# Patient Record
Sex: FEMALE | Race: BLACK OR AFRICAN AMERICAN | Employment: UNEMPLOYED | URBAN - METROPOLITAN AREA
[De-identification: names, ages, dates, MRNs, and addresses within clinical notes are randomized per-mention and may not be internally consistent; named-entity substitution may affect disease eponyms.]

---

## 2017-05-26 ENCOUNTER — HOSPITAL ENCOUNTER (EMERGENCY)
Age: 27
Discharge: HOME | End: 2017-05-27
Attending: EMERGENCY MEDICINE
Payer: COMMERCIAL

## 2017-05-26 VITALS
DIASTOLIC BLOOD PRESSURE: 65 MMHG | HEART RATE: 79 BPM | TEMPERATURE: 97.9 F | SYSTOLIC BLOOD PRESSURE: 107 MMHG | WEIGHT: 136 LBS | OXYGEN SATURATION: 98 % | RESPIRATION RATE: 15 BRPM | HEIGHT: 63 IN | BODY MASS INDEX: 24.1 KG/M2

## 2017-05-26 DIAGNOSIS — B97.89 VIRAL SINUSITIS: Primary | ICD-10-CM

## 2017-05-26 DIAGNOSIS — J32.9 VIRAL SINUSITIS: Primary | ICD-10-CM

## 2017-05-26 PROCEDURE — 99282 EMERGENCY DEPT VISIT SF MDM: CPT

## 2017-05-26 ASSESSMENT — PAIN DESCRIPTION - LOCATION: LOCATION: THROAT

## 2017-05-26 ASSESSMENT — PAIN DESCRIPTION - DESCRIPTORS: DESCRIPTORS: SORE

## 2017-05-26 ASSESSMENT — PAIN SCALES - GENERAL: PAINLEVEL_OUTOF10: 7

## 2017-05-27 RX ORDER — IBUPROFEN 400 MG/1
400 TABLET ORAL EVERY 6 HOURS PRN
Qty: 30 TABLET | Refills: 0 | Status: SHIPPED | OUTPATIENT
Start: 2017-05-27 | End: 2019-07-03 | Stop reason: SDUPTHER

## 2017-05-27 RX ORDER — PSEUDOEPHEDRINE HYDROCHLORIDE 30 MG/1
30 TABLET ORAL EVERY 6 HOURS PRN
Qty: 10 TABLET | Refills: 0 | Status: SHIPPED | OUTPATIENT
Start: 2017-05-27 | End: 2017-05-27 | Stop reason: CLARIF

## 2017-05-27 RX ORDER — FLUTICASONE PROPIONATE 50 MCG
1 SPRAY, SUSPENSION (ML) NASAL DAILY
Qty: 1 BOTTLE | Refills: 0 | Status: SHIPPED | OUTPATIENT
Start: 2017-05-27 | End: 2019-10-22 | Stop reason: ALTCHOICE

## 2017-05-27 ASSESSMENT — ENCOUNTER SYMPTOMS
WHEEZING: 0
ABDOMINAL PAIN: 0
HOARSE VOICE: 1
NAUSEA: 0
SORE THROAT: 0
VOMITING: 0
SHORTNESS OF BREATH: 0
COUGH: 1
CHOKING: 0
SINUS PRESSURE: 1
SWOLLEN GLANDS: 0
CHEST TIGHTNESS: 0
RHINORRHEA: 1
VOICE CHANGE: 1
CONSTIPATION: 0
DIARRHEA: 0
EYE PAIN: 0
TROUBLE SWALLOWING: 0

## 2018-01-05 ENCOUNTER — HOSPITAL ENCOUNTER (OUTPATIENT)
Age: 28
Setting detail: SPECIMEN
Discharge: HOME | End: 2018-01-05
Payer: COMMERCIAL

## 2018-01-05 LAB
ABSOLUTE EOS #: 0.08 K/UL (ref 0–0.44)
ABSOLUTE IMMATURE GRANULOCYTE: <0.03 K/UL (ref 0–0.3)
ABSOLUTE LYMPH #: 2.44 K/UL (ref 1.1–3.7)
ABSOLUTE MONO #: 0.47 K/UL (ref 0.1–1.2)
ALBUMIN SERPL-MCNC: 4.7 G/DL (ref 3.5–5.2)
ALBUMIN/GLOBULIN RATIO: 1.5 (ref 1–2.5)
ALP BLD-CCNC: 51 U/L (ref 35–104)
ALT SERPL-CCNC: 15 U/L (ref 5–33)
ANION GAP SERPL CALCULATED.3IONS-SCNC: 13 MMOL/L (ref 9–17)
AST SERPL-CCNC: 22 U/L
BASOPHILS # BLD: 0 % (ref 0–2)
BASOPHILS ABSOLUTE: <0.03 K/UL (ref 0–0.2)
BILIRUB SERPL-MCNC: 0.54 MG/DL (ref 0.3–1.2)
BILIRUBIN URINE: NEGATIVE
BUN BLDV-MCNC: 14 MG/DL (ref 6–20)
BUN/CREAT BLD: NORMAL (ref 9–20)
CALCIUM SERPL-MCNC: 9.6 MG/DL (ref 8.6–10.4)
CHLORIDE BLD-SCNC: 101 MMOL/L (ref 98–107)
CHOLESTEROL/HDL RATIO: 2.3
CHOLESTEROL: 157 MG/DL
CO2: 24 MMOL/L (ref 20–31)
COLOR: YELLOW
COMMENT UA: NORMAL
CREAT SERPL-MCNC: 0.72 MG/DL (ref 0.5–0.9)
DIFFERENTIAL TYPE: ABNORMAL
EOSINOPHILS RELATIVE PERCENT: 1 % (ref 1–4)
ESTIMATED AVERAGE GLUCOSE: 94 MG/DL
GFR AFRICAN AMERICAN: >60 ML/MIN
GFR NON-AFRICAN AMERICAN: >60 ML/MIN
GFR SERPL CREATININE-BSD FRML MDRD: NORMAL ML/MIN/{1.73_M2}
GFR SERPL CREATININE-BSD FRML MDRD: NORMAL ML/MIN/{1.73_M2}
GLUCOSE BLD-MCNC: 72 MG/DL (ref 70–99)
GLUCOSE URINE: NEGATIVE
HBA1C MFR BLD: 4.9 % (ref 4–6)
HCT VFR BLD CALC: 42.4 % (ref 36.3–47.1)
HDLC SERPL-MCNC: 67 MG/DL
HEMOGLOBIN: 13.9 G/DL (ref 11.9–15.1)
IMMATURE GRANULOCYTES: 0 %
KETONES, URINE: NEGATIVE
LDL CHOLESTEROL: 74 MG/DL (ref 0–130)
LEUKOCYTE ESTERASE, URINE: NEGATIVE
LYMPHOCYTES # BLD: 44 % (ref 24–43)
MCH RBC QN AUTO: 33.2 PG (ref 25.2–33.5)
MCHC RBC AUTO-ENTMCNC: 32.8 G/DL (ref 28.4–34.8)
MCV RBC AUTO: 101.2 FL (ref 82.6–102.9)
MONOCYTES # BLD: 9 % (ref 3–12)
NITRITE, URINE: NEGATIVE
PDW BLD-RTO: 13.5 % (ref 11.8–14.4)
PH UA: 5.5 (ref 5–8)
PLATELET # BLD: 232 K/UL (ref 138–453)
PLATELET ESTIMATE: ABNORMAL
PMV BLD AUTO: 10.1 FL (ref 8.1–13.5)
POTASSIUM SERPL-SCNC: 4.1 MMOL/L (ref 3.7–5.3)
PROTEIN UA: NEGATIVE
RBC # BLD: 4.19 M/UL (ref 3.95–5.11)
RBC # BLD: ABNORMAL 10*6/UL
SEG NEUTROPHILS: 46 % (ref 36–65)
SEGMENTED NEUTROPHILS ABSOLUTE COUNT: 2.51 K/UL (ref 1.5–8.1)
SODIUM BLD-SCNC: 138 MMOL/L (ref 135–144)
SPECIFIC GRAVITY UA: 1.03 (ref 1–1.03)
THYROXINE, FREE: 1.19 NG/DL (ref 0.93–1.7)
TOTAL PROTEIN: 7.9 G/DL (ref 6.4–8.3)
TRIGL SERPL-MCNC: 78 MG/DL
TSH SERPL DL<=0.05 MIU/L-ACNC: 0.74 MIU/L (ref 0.3–5)
TURBIDITY: CLEAR
URINE HGB: NEGATIVE
UROBILINOGEN, URINE: NORMAL
VITAMIN D 25-HYDROXY: 13.1 NG/ML (ref 30–100)
VLDLC SERPL CALC-MCNC: NORMAL MG/DL (ref 1–30)
WBC # BLD: 5.5 K/UL (ref 3.5–11.3)
WBC # BLD: ABNORMAL 10*3/UL

## 2018-01-09 ENCOUNTER — HOSPITAL ENCOUNTER (OUTPATIENT)
Dept: GENERAL RADIOLOGY | Age: 28
Discharge: HOME | End: 2018-01-09
Payer: COMMERCIAL

## 2018-01-09 ENCOUNTER — HOSPITAL ENCOUNTER (OUTPATIENT)
Age: 28
Discharge: HOME | End: 2018-01-09
Payer: COMMERCIAL

## 2018-01-09 DIAGNOSIS — M54.31 BACK PAIN WITH RIGHT-SIDED SCIATICA: ICD-10-CM

## 2018-01-09 PROCEDURE — 72220 X-RAY EXAM SACRUM TAILBONE: CPT

## 2018-01-09 PROCEDURE — 72100 X-RAY EXAM L-S SPINE 2/3 VWS: CPT

## 2018-10-17 ENCOUNTER — OFFICE VISIT (OUTPATIENT)
Dept: OBGYN | Age: 28
End: 2018-10-17
Payer: COMMERCIAL

## 2018-10-17 ENCOUNTER — HOSPITAL ENCOUNTER (OUTPATIENT)
Age: 28
Setting detail: SPECIMEN
Discharge: HOME | End: 2018-10-17
Payer: COMMERCIAL

## 2018-10-17 VITALS
SYSTOLIC BLOOD PRESSURE: 131 MMHG | BODY MASS INDEX: 27.27 KG/M2 | HEART RATE: 85 BPM | WEIGHT: 153.9 LBS | HEIGHT: 63 IN | DIASTOLIC BLOOD PRESSURE: 86 MMHG

## 2018-10-17 DIAGNOSIS — N89.8 VAGINAL ODOR: ICD-10-CM

## 2018-10-17 DIAGNOSIS — Z01.419 WELL WOMAN EXAM WITH ROUTINE GYNECOLOGICAL EXAM: Primary | ICD-10-CM

## 2018-10-17 LAB
DIRECT EXAM: ABNORMAL
Lab: ABNORMAL
SPECIMEN DESCRIPTION: ABNORMAL
STATUS: ABNORMAL

## 2018-10-17 PROCEDURE — 99385 PREV VISIT NEW AGE 18-39: CPT | Performed by: OBSTETRICS & GYNECOLOGY

## 2018-10-17 PROCEDURE — 99203 OFFICE O/P NEW LOW 30 MIN: CPT | Performed by: OBSTETRICS & GYNECOLOGY

## 2018-10-17 PROCEDURE — G8484 FLU IMMUNIZE NO ADMIN: HCPCS | Performed by: OBSTETRICS & GYNECOLOGY

## 2018-10-17 RX ORDER — METRONIDAZOLE 500 MG/1
500 TABLET ORAL 2 TIMES DAILY
Qty: 14 TABLET | Refills: 0 | Status: SHIPPED | OUTPATIENT
Start: 2018-10-17 | End: 2018-10-24

## 2018-10-17 RX ORDER — NAPROXEN 500 MG/1
500 TABLET ORAL
COMMUNITY
Start: 2017-02-16 | End: 2019-07-03

## 2018-10-17 RX ORDER — FLUCONAZOLE 150 MG/1
TABLET ORAL
Refills: 0 | COMMUNITY
Start: 2018-07-24 | End: 2019-10-22 | Stop reason: ALTCHOICE

## 2018-10-18 LAB
C TRACH DNA GENITAL QL NAA+PROBE: NEGATIVE
N. GONORRHOEAE DNA: NEGATIVE

## 2018-10-18 RX ORDER — METRONIDAZOLE 500 MG/1
500 TABLET ORAL 2 TIMES DAILY
Qty: 14 TABLET | Refills: 0 | Status: SHIPPED | OUTPATIENT
Start: 2018-10-18 | End: 2018-10-25

## 2018-11-03 LAB — CYTOLOGY REPORT: NORMAL

## 2019-01-17 ENCOUNTER — OFFICE VISIT (OUTPATIENT)
Dept: OBGYN | Age: 29
End: 2019-01-17
Payer: COMMERCIAL

## 2019-01-17 VITALS
HEART RATE: 110 BPM | BODY MASS INDEX: 26.21 KG/M2 | HEIGHT: 63 IN | DIASTOLIC BLOOD PRESSURE: 82 MMHG | WEIGHT: 147.9 LBS | SYSTOLIC BLOOD PRESSURE: 118 MMHG

## 2019-01-17 DIAGNOSIS — Z30.432 ENCOUNTER FOR REMOVAL OF INTRAUTERINE CONTRACEPTIVE DEVICE (IUD): Primary | ICD-10-CM

## 2019-01-17 DIAGNOSIS — Z30.09 FAMILY PLANNING: ICD-10-CM

## 2019-01-17 DIAGNOSIS — B00.9 HSV-2 (HERPES SIMPLEX VIRUS 2) INFECTION: ICD-10-CM

## 2019-01-17 PROCEDURE — 58301 REMOVE INTRAUTERINE DEVICE: CPT | Performed by: OBSTETRICS & GYNECOLOGY

## 2019-01-17 PROCEDURE — 99212 OFFICE O/P EST SF 10 MIN: CPT | Performed by: OBSTETRICS & GYNECOLOGY

## 2019-01-17 RX ORDER — MEDROXYPROGESTERONE ACETATE 150 MG/ML
150 INJECTION, SUSPENSION INTRAMUSCULAR
Qty: 1 ML | Refills: 3 | Status: SHIPPED | OUTPATIENT
Start: 2019-01-17 | End: 2019-10-22

## 2019-07-03 ENCOUNTER — HOSPITAL ENCOUNTER (EMERGENCY)
Age: 29
Discharge: HOME | End: 2019-07-03
Attending: EMERGENCY MEDICINE
Payer: COMMERCIAL

## 2019-07-03 VITALS
HEART RATE: 94 BPM | SYSTOLIC BLOOD PRESSURE: 126 MMHG | RESPIRATION RATE: 14 BRPM | TEMPERATURE: 97.9 F | OXYGEN SATURATION: 99 % | DIASTOLIC BLOOD PRESSURE: 72 MMHG

## 2019-07-03 DIAGNOSIS — K04.7 DENTAL INFECTION: Primary | ICD-10-CM

## 2019-07-03 PROCEDURE — 6370000000 HC RX 637 (ALT 250 FOR IP): Performed by: EMERGENCY MEDICINE

## 2019-07-03 PROCEDURE — 99283 EMERGENCY DEPT VISIT LOW MDM: CPT

## 2019-07-03 RX ORDER — IBUPROFEN 400 MG/1
400 TABLET ORAL ONCE
Status: COMPLETED | OUTPATIENT
Start: 2019-07-03 | End: 2019-07-03

## 2019-07-03 RX ORDER — PENICILLIN V POTASSIUM 500 MG/1
500 TABLET ORAL 4 TIMES DAILY
Qty: 28 TABLET | Refills: 0 | Status: SHIPPED | OUTPATIENT
Start: 2019-07-03 | End: 2019-07-10

## 2019-07-03 RX ORDER — PENICILLIN V POTASSIUM 250 MG/1
500 TABLET ORAL ONCE
Status: COMPLETED | OUTPATIENT
Start: 2019-07-03 | End: 2019-07-03

## 2019-07-03 RX ORDER — IBUPROFEN 800 MG/1
800 TABLET ORAL EVERY 8 HOURS PRN
Qty: 30 TABLET | Refills: 0 | Status: SHIPPED | OUTPATIENT
Start: 2019-07-03 | End: 2019-10-22

## 2019-07-03 RX ADMIN — PENICILLIN V POTASSIUM 500 MG: 250 TABLET ORAL at 09:47

## 2019-07-03 RX ADMIN — IBUPROFEN 400 MG: 400 TABLET, FILM COATED ORAL at 09:47

## 2019-07-03 ASSESSMENT — ENCOUNTER SYMPTOMS
FACIAL SWELLING: 1
SORE THROAT: 0
COUGH: 0
CONSTIPATION: 0
RHINORRHEA: 0
WHEEZING: 0
SHORTNESS OF BREATH: 0
DIARRHEA: 0
NAUSEA: 0
VOMITING: 0
ABDOMINAL DISTENTION: 0

## 2019-07-03 ASSESSMENT — PAIN SCALES - GENERAL
PAINLEVEL_OUTOF10: 5
PAINLEVEL_OUTOF10: 6

## 2019-07-03 ASSESSMENT — PAIN DESCRIPTION - ORIENTATION: ORIENTATION: RIGHT;LOWER

## 2019-07-03 ASSESSMENT — PAIN DESCRIPTION - ONSET: ONSET: PROGRESSIVE

## 2019-07-03 ASSESSMENT — PAIN DESCRIPTION - PAIN TYPE: TYPE: ACUTE PAIN

## 2019-07-03 ASSESSMENT — PAIN DESCRIPTION - PROGRESSION: CLINICAL_PROGRESSION: GRADUALLY WORSENING

## 2019-07-03 ASSESSMENT — PAIN DESCRIPTION - DESCRIPTORS: DESCRIPTORS: ACHING

## 2019-07-03 ASSESSMENT — PAIN DESCRIPTION - LOCATION: LOCATION: MOUTH

## 2019-07-03 NOTE — ED NOTES
[facility]  [address]  Phone: [phone]  Fax: [phone]    Patient Appointment Information    To schedule an appointment at the [facility] for a patient please call:    [phone] for [address] / [phone] for Adults    Appointments for children are available the day the call is placed. Adult appointments are generally available within 48 to 72 hours. Information required making an appointment:    Bassam Patino  34 y.o. 1990 [phone] (home)    Chief Complaint   Patient presents with    Oral Swelling     pt with swelling noted to right side of jaw, began this morning. pt had pain to lower molar last night, broken. pt has a dentist to follow up with. Appointment day and time: Monday, July 8 @ 1000    Please as the patient to bring Medicaid card, Medicaid HMO card, or other insurance card. For self-pay, cost of emergency appointment is $38 for adults or $25 for children age 21 and under. [facility] is not a free [facility] and fees are due at time of service.       Signature:  Hazel Mirza Date:  7/3/19       Michele Stringer RN  07/03/19 [id number]

## 2019-07-03 NOTE — ED PROVIDER NOTES
[address] [facility]  ED  Emergency Department        Pt Name: Ciara Gregg  MRN: [id number]  [place] 1990  Date of evaluation: 7/3/19    CHIEF COMPLAINT       Chief Complaint   Patient presents with    Oral Swelling     pt with swelling noted to right side of jaw, began this morning. pt had pain to lower molar last night, broken. pt has a dentist to follow up with. HISTORY OF PRESENT ILLNESS  (Location/Symptom, Timing/Onset, Context/Setting, Quality, Duration, ModifyingFactors, Severity.)      Ciara Gregg is a 34 y.o. female who presents with pain to her right lower molar that started yesterday, noticed swelling today. Patient complains of pain, no fevers or chills tolerating oral intake. PAST MEDICAL / SURGICAL / SOCIAL / FAMILY HISTORY      has a past medical history of HSV-2 (herpes simplex virus 2) infection. has no past surgical history on file. Social History     Socioeconomic History    Marital status: Single     Spouse name: Not on file    Number of children: Not on file    Years of education: Not on file    Highest education level: Not on file   Occupational History    Not on file   Social Needs    Financial resource strain: Not on file    Food insecurity:     Worry: Not on file     Inability: Not on file    Transportation needs:     Medical: Not on file     Non-medical: Not on file   Tobacco Use    Smoking status: Current Every Day Smoker     Packs/day: 1.00     Types: Cigarettes    Smokeless tobacco: Never Used   Substance and Sexual Activity    Alcohol use:  Yes    Drug use: No    Sexual activity: Yes     Partners: Male, Female   Lifestyle    Physical activity:     Days per week: Not on file     Minutes per session: Not on file    Stress: Not on file   Relationships    Social connections:     Talks on phone: Not on file     Gets together: Not on file     Attends Protestant service: Not on file     Active member of club or organization: Not on file

## 2019-07-31 ENCOUNTER — TELEPHONE (OUTPATIENT)
Dept: OBGYN | Age: 29
End: 2019-07-31

## 2019-08-06 ENCOUNTER — TELEPHONE (OUTPATIENT)
Dept: OBGYN | Age: 29
End: 2019-08-06

## 2019-08-15 DIAGNOSIS — Z34.90 PREGNANCY, UNSPECIFIED GESTATIONAL AGE: Primary | ICD-10-CM

## 2019-08-21 ENCOUNTER — TELEPHONE (OUTPATIENT)
Dept: OBGYN | Age: 29
End: 2019-08-21

## 2019-08-21 ENCOUNTER — PROCEDURE VISIT (OUTPATIENT)
Dept: OBGYN | Age: 29
End: 2019-08-21
Payer: COMMERCIAL

## 2019-08-21 DIAGNOSIS — Z34.90 PREGNANCY, UNSPECIFIED GESTATIONAL AGE: ICD-10-CM

## 2019-08-21 PROCEDURE — 76801 OB US < 14 WKS SINGLE FETUS: CPT | Performed by: RADIOLOGY

## 2019-08-21 NOTE — TELEPHONE ENCOUNTER
Please confirm with patient is requesting prenatal vitamins? Please explained to patient that we will not provide iron pills until prenatal lab results are done so we know what her blood count is. However if she needs a prescription for prenatal vitamins we can send that to the pharmacy.

## 2019-08-22 ENCOUNTER — PROCEDURE VISIT (OUTPATIENT)
Dept: OBGYN | Age: 29
End: 2019-08-22
Payer: COMMERCIAL

## 2019-08-22 DIAGNOSIS — Z34.90 PREGNANCY, UNSPECIFIED GESTATIONAL AGE: ICD-10-CM

## 2019-08-22 DIAGNOSIS — Z34.90 PREGNANCY, UNSPECIFIED GESTATIONAL AGE: Primary | ICD-10-CM

## 2019-08-22 PROCEDURE — 76815 OB US LIMITED FETUS(S): CPT | Performed by: RADIOLOGY

## 2019-08-27 ENCOUNTER — TELEPHONE (OUTPATIENT)
Dept: OBGYN | Age: 29
End: 2019-08-27

## 2019-08-27 NOTE — TELEPHONE ENCOUNTER
Patient did not answer phone call and voicemail is full.  No show letter mailed to patient due to not being able to reach her by phone

## 2019-08-27 NOTE — TELEPHONE ENCOUNTER
----- Message from Jyothi Ceron RN sent at 8/26/2019  4:22 PM EDT -----  Regarding: no show for ob ed  Pt did not show today for Ob ed. Please call pt and see if she desires prenatal care at our office. Ok to rescheduled if she desires.       Thank you- Nik Agarwal

## 2019-09-26 ENCOUNTER — TELEPHONE (OUTPATIENT)
Dept: OBGYN | Age: 29
End: 2019-09-26

## 2019-09-26 NOTE — TELEPHONE ENCOUNTER
Patient called office to R/S missed appointment. Patient was informed that she is going to be scheduled for OB edu. Patient states \"I already see a  for that and she told me that I do not need that appointment. \" Writer informed the patient that the office process is to schedule appointment for [facility] education first, patient was offered appointment for OB Edu on 10/22/19. Patient states \"Why so long\". Writer informed her that these appt are limited and they book up quickly. Patient states \"I will find another doctor thank you\".  Call disconnected

## 2019-10-22 ENCOUNTER — INITIAL PRENATAL (OUTPATIENT)
Dept: OBGYN | Age: 29
End: 2019-10-22
Payer: COMMERCIAL

## 2019-10-22 VITALS
BODY MASS INDEX: 26.57 KG/M2 | WEIGHT: 150 LBS | SYSTOLIC BLOOD PRESSURE: 98 MMHG | HEART RATE: 72 BPM | DIASTOLIC BLOOD PRESSURE: 70 MMHG

## 2019-10-22 DIAGNOSIS — Z83.2 FAMILY HISTORY OF SICKLE CELL TRAIT IN FATHER: ICD-10-CM

## 2019-10-22 DIAGNOSIS — O09.92 HIGH-RISK PREGNANCY, SECOND TRIMESTER: Primary | ICD-10-CM

## 2019-10-22 DIAGNOSIS — Z87.59 HISTORY OF POSTPARTUM DEPRESSION: ICD-10-CM

## 2019-10-22 DIAGNOSIS — F41.8 DEPRESSION WITH ANXIETY: ICD-10-CM

## 2019-10-22 DIAGNOSIS — B00.9 HSV-2 (HERPES SIMPLEX VIRUS 2) INFECTION: ICD-10-CM

## 2019-10-22 DIAGNOSIS — O09.32 LATE PRENATAL CARE AFFECTING PREGNANCY IN SECOND TRIMESTER: ICD-10-CM

## 2019-10-22 DIAGNOSIS — Z86.59 HISTORY OF POSTPARTUM DEPRESSION: ICD-10-CM

## 2019-10-22 DIAGNOSIS — F17.200 SMOKER: ICD-10-CM

## 2019-10-22 PROCEDURE — 59899 UNLISTED PX MAT CARE&DLVR: CPT | Performed by: OBSTETRICS & GYNECOLOGY

## 2019-10-22 PROCEDURE — G8419 CALC BMI OUT NRM PARAM NOF/U: HCPCS | Performed by: OBSTETRICS & GYNECOLOGY

## 2019-10-22 PROCEDURE — 99211 OFF/OP EST MAY X REQ PHY/QHP: CPT | Performed by: OBSTETRICS & GYNECOLOGY

## 2019-10-22 PROCEDURE — G8427 DOCREV CUR MEDS BY ELIG CLIN: HCPCS | Performed by: OBSTETRICS & GYNECOLOGY

## 2019-10-22 PROCEDURE — 99213 OFFICE O/P EST LOW 20 MIN: CPT | Performed by: OBSTETRICS & GYNECOLOGY

## 2019-10-30 ENCOUNTER — INITIAL PRENATAL (OUTPATIENT)
Dept: OBGYN | Age: 29
End: 2019-10-30
Payer: COMMERCIAL

## 2019-10-30 ENCOUNTER — HOSPITAL ENCOUNTER (OUTPATIENT)
Age: 29
Setting detail: SPECIMEN
Discharge: HOME | End: 2019-10-30
Payer: COMMERCIAL

## 2019-10-30 VITALS
SYSTOLIC BLOOD PRESSURE: 96 MMHG | BODY MASS INDEX: 26.75 KG/M2 | WEIGHT: 151 LBS | DIASTOLIC BLOOD PRESSURE: 59 MMHG | HEART RATE: 72 BPM

## 2019-10-30 DIAGNOSIS — O09.92 HIGH-RISK PREGNANCY, SECOND TRIMESTER: Primary | ICD-10-CM

## 2019-10-30 DIAGNOSIS — Z87.59 HISTORY OF POSTPARTUM DEPRESSION: ICD-10-CM

## 2019-10-30 DIAGNOSIS — F41.8 DEPRESSION WITH ANXIETY: ICD-10-CM

## 2019-10-30 DIAGNOSIS — O09.92 HIGH-RISK PREGNANCY, SECOND TRIMESTER: ICD-10-CM

## 2019-10-30 DIAGNOSIS — F17.200 SMOKER: ICD-10-CM

## 2019-10-30 DIAGNOSIS — O09.32 LATE PRENATAL CARE AFFECTING PREGNANCY IN SECOND TRIMESTER: ICD-10-CM

## 2019-10-30 DIAGNOSIS — Z86.59 HISTORY OF POSTPARTUM DEPRESSION: ICD-10-CM

## 2019-10-30 LAB
-: ABNORMAL
ABO/RH: NORMAL
ABSOLUTE EOS #: 0.06 K/UL (ref 0–0.44)
ABSOLUTE IMMATURE GRANULOCYTE: <0.03 K/UL (ref 0–0.3)
ABSOLUTE LYMPH #: 2.73 K/UL (ref 1.1–3.7)
ABSOLUTE MONO #: 0.5 K/UL (ref 0.1–1.2)
AMORPHOUS: ABNORMAL
ANTIBODY SCREEN: NEGATIVE
BACTERIA: ABNORMAL
BASOPHILS # BLD: 0 % (ref 0–2)
BASOPHILS ABSOLUTE: <0.03 K/UL (ref 0–0.2)
BILIRUBIN URINE: NEGATIVE
CASTS UA: ABNORMAL /LPF (ref 0–2)
COLOR: YELLOW
CRYSTALS, UA: ABNORMAL /HPF
DIFFERENTIAL TYPE: ABNORMAL
EOSINOPHILS RELATIVE PERCENT: 1 % (ref 1–4)
EPITHELIAL CELLS UA: ABNORMAL /HPF (ref 0–5)
GLUCOSE URINE: NEGATIVE
HCT VFR BLD CALC: 30.8 % (ref 36.3–47.1)
HEMOGLOBIN: 10.2 G/DL (ref 11.9–15.1)
HEPATITIS B SURFACE ANTIGEN: NONREACTIVE
IMMATURE GRANULOCYTES: 0 %
KETONES, URINE: ABNORMAL
LEUKOCYTE ESTERASE, URINE: ABNORMAL
LYMPHOCYTES # BLD: 30 % (ref 24–43)
MCH RBC QN AUTO: 32.5 PG (ref 25.2–33.5)
MCHC RBC AUTO-ENTMCNC: 33.1 G/DL (ref 28.4–34.8)
MCV RBC AUTO: 98.1 FL (ref 82.6–102.9)
MONOCYTES # BLD: 6 % (ref 3–12)
MUCUS: ABNORMAL
NITRITE, URINE: NEGATIVE
NRBC AUTOMATED: 0 PER 100 WBC
OTHER OBSERVATIONS UA: ABNORMAL
PDW BLD-RTO: 13.8 % (ref 11.8–14.4)
PH UA: 6 (ref 5–8)
PLATELET # BLD: 250 K/UL (ref 138–453)
PLATELET ESTIMATE: ABNORMAL
PMV BLD AUTO: 9.9 FL (ref 8.1–13.5)
PROTEIN UA: ABNORMAL
RBC # BLD: 3.14 M/UL (ref 3.95–5.11)
RBC # BLD: ABNORMAL 10*6/UL
RBC UA: ABNORMAL /HPF (ref 0–2)
RENAL EPITHELIAL, UA: ABNORMAL /HPF
RUBV IGG SER QL: 112.8 IU/ML
SEG NEUTROPHILS: 63 % (ref 36–65)
SEGMENTED NEUTROPHILS ABSOLUTE COUNT: 5.68 K/UL (ref 1.5–8.1)
SPECIFIC GRAVITY UA: 1.03 (ref 1–1.03)
T. PALLIDUM, IGG: NONREACTIVE
TRICHOMONAS: ABNORMAL
TURBIDITY: ABNORMAL
URINE HGB: NEGATIVE
UROBILINOGEN, URINE: NORMAL
WBC # BLD: 9 K/UL (ref 3.5–11.3)
WBC # BLD: ABNORMAL 10*3/UL
WBC UA: ABNORMAL /HPF (ref 0–5)
YEAST: ABNORMAL

## 2019-10-30 PROCEDURE — 84702 CHORIONIC GONADOTROPIN TEST: CPT

## 2019-10-30 PROCEDURE — 90686 IIV4 VACC NO PRSV 0.5 ML IM: CPT

## 2019-10-30 PROCEDURE — G8484 FLU IMMUNIZE NO ADMIN: HCPCS | Performed by: OBSTETRICS & GYNECOLOGY

## 2019-10-30 PROCEDURE — 87510 GARDNER VAG DNA DIR PROBE: CPT

## 2019-10-30 PROCEDURE — 87660 TRICHOMONAS VAGIN DIR PROBE: CPT

## 2019-10-30 PROCEDURE — G8419 CALC BMI OUT NRM PARAM NOF/U: HCPCS | Performed by: OBSTETRICS & GYNECOLOGY

## 2019-10-30 PROCEDURE — 36415 COLL VENOUS BLD VENIPUNCTURE: CPT

## 2019-10-30 PROCEDURE — 86900 BLOOD TYPING SEROLOGIC ABO: CPT

## 2019-10-30 PROCEDURE — 82105 ALPHA-FETOPROTEIN SERUM: CPT

## 2019-10-30 PROCEDURE — 83020 HEMOGLOBIN ELECTROPHORESIS: CPT

## 2019-10-30 PROCEDURE — 86850 RBC ANTIBODY SCREEN: CPT

## 2019-10-30 PROCEDURE — 82677 ASSAY OF ESTRIOL: CPT

## 2019-10-30 PROCEDURE — 85025 COMPLETE CBC W/AUTO DIFF WBC: CPT

## 2019-10-30 PROCEDURE — 86780 TREPONEMA PALLIDUM: CPT

## 2019-10-30 PROCEDURE — 4004F PT TOBACCO SCREEN RCVD TLK: CPT | Performed by: OBSTETRICS & GYNECOLOGY

## 2019-10-30 PROCEDURE — 81001 URINALYSIS AUTO W/SCOPE: CPT

## 2019-10-30 PROCEDURE — 87491 CHLMYD TRACH DNA AMP PROBE: CPT

## 2019-10-30 PROCEDURE — 86336 INHIBIN A: CPT

## 2019-10-30 PROCEDURE — 86901 BLOOD TYPING SEROLOGIC RH(D): CPT

## 2019-10-30 PROCEDURE — 87086 URINE CULTURE/COLONY COUNT: CPT

## 2019-10-30 PROCEDURE — 99214 OFFICE O/P EST MOD 30 MIN: CPT | Performed by: OBSTETRICS & GYNECOLOGY

## 2019-10-30 PROCEDURE — 87480 CANDIDA DNA DIR PROBE: CPT

## 2019-10-30 PROCEDURE — 87591 N.GONORRHOEAE DNA AMP PROB: CPT

## 2019-10-30 PROCEDURE — 87340 HEPATITIS B SURFACE AG IA: CPT

## 2019-10-30 PROCEDURE — 81220 CFTR GENE COM VARIANTS: CPT

## 2019-10-30 PROCEDURE — G8427 DOCREV CUR MEDS BY ELIG CLIN: HCPCS | Performed by: OBSTETRICS & GYNECOLOGY

## 2019-10-30 PROCEDURE — 87389 HIV-1 AG W/HIV-1&-2 AB AG IA: CPT

## 2019-10-30 PROCEDURE — 86762 RUBELLA ANTIBODY: CPT

## 2019-10-31 LAB
CULTURE: NORMAL
DIRECT EXAM: NORMAL
HGB ELECTROPHORESIS INTERP: NORMAL
HIV AG/AB: NONREACTIVE
Lab: NORMAL
Lab: NORMAL
PATHOLOGIST: NORMAL
SPECIMEN DESCRIPTION: NORMAL
SPECIMEN DESCRIPTION: NORMAL

## 2019-11-01 LAB
C TRACH DNA GENITAL QL NAA+PROBE: NEGATIVE
N. GONORRHOEAE DNA: NEGATIVE
SPECIMEN DESCRIPTION: NORMAL

## 2019-11-02 LAB
AFP INTERPRETATION: NORMAL
AFP MOM: 0.88
AFP QUAD INTERPRETATION: NORMAL
AFP SPECIMEN: NORMAL
AFP: 54 NG/ML
DATE OF BIRTH: NORMAL
DATING METHOD: NORMAL
DETERMINED BY: NORMAL
DIABETIC: NEGATIVE
DIMERIC INHIBIN A: 285 PG/ML
DUE DATE: NORMAL
ESTIMATED DUE DATE: NORMAL
FAMILY HISTORY NTD: NEGATIVE
GESTATIONAL AGE: NORMAL
HISTORY OF ANEUPLOIDY?: NO
IN VITRO FERTILIZATION: NEGATIVE
INHIBIN A MOM: 1.78
INSULIN REQ DIABETES: NO
LAST MENSTRUAL PERIOD: NORMAL
MATERNAL AGE AT EDD: 29.7 YR
MATERNAL WEIGHT: 150
MOM FOR HCG, 2ND TRIMESTER: 0.73
MONOCHORIONIC TWINS: NORMAL
NUMBER OF FETUSES: NORMAL
PATIENT WEIGHT UNITS: NORMAL
PATIENT WEIGHT: NORMAL
PATIENT'S HCG, TRI 2: NORMAL IU/L
PREV TRISOMY PREG: NEGATIVE
RACE (MATERNAL): NORMAL
RACE: NORMAL
REPEAT SPECIMEN?: NEGATIVE
SMOKING: NORMAL
SMOKING: NORMAL
UE3 MOM: 0.85
UE3 VALUE: 2 NG/ML
VALPROIC/CARBAMAZEP: NEGATIVE
ZZ NTE CLEAN UP: HISTORY: NO

## 2019-11-04 LAB — CYSTIC FIBROSIS: NORMAL

## 2019-11-11 ENCOUNTER — TELEPHONE (OUTPATIENT)
Dept: OBGYN | Age: 29
End: 2019-11-11

## 2019-11-12 ENCOUNTER — ROUTINE PRENATAL (OUTPATIENT)
Dept: PERINATAL CARE | Age: 29
End: 2019-11-12
Payer: COMMERCIAL

## 2019-11-12 VITALS
DIASTOLIC BLOOD PRESSURE: 62 MMHG | HEIGHT: 63 IN | HEART RATE: 84 BPM | SYSTOLIC BLOOD PRESSURE: 104 MMHG | BODY MASS INDEX: 26.93 KG/M2 | TEMPERATURE: 97.8 F | WEIGHT: 152 LBS | RESPIRATION RATE: 16 BRPM

## 2019-11-12 DIAGNOSIS — O09.42 GRAND MULTIPARITY WITH CURRENT PREGNANCY IN SECOND TRIMESTER: Primary | ICD-10-CM

## 2019-11-12 DIAGNOSIS — Z36.86 SCREENING, ANTENATAL, FOR RISK OF PRE-TERM LABOR: ICD-10-CM

## 2019-11-12 DIAGNOSIS — Z3A.21 21 WEEKS GESTATION OF PREGNANCY: ICD-10-CM

## 2019-11-12 DIAGNOSIS — O99.330 TOBACCO USE DISORDER COMPLICATING PREGNANCY, CHILDBIRTH, OR PUERPERIUM, ANTEPARTUM: ICD-10-CM

## 2019-11-12 DIAGNOSIS — O09.32 INSUFFICIENT PRENATAL CARE IN SECOND TRIMESTER: ICD-10-CM

## 2019-11-12 PROCEDURE — 76811 OB US DETAILED SNGL FETUS: CPT | Performed by: OBSTETRICS & GYNECOLOGY

## 2019-11-12 PROCEDURE — 76817 TRANSVAGINAL US OBSTETRIC: CPT | Performed by: OBSTETRICS & GYNECOLOGY

## 2019-11-27 ENCOUNTER — ROUTINE PRENATAL (OUTPATIENT)
Dept: OBGYN | Age: 29
End: 2019-11-27
Payer: COMMERCIAL

## 2019-11-27 VITALS
SYSTOLIC BLOOD PRESSURE: 100 MMHG | DIASTOLIC BLOOD PRESSURE: 68 MMHG | HEART RATE: 69 BPM | WEIGHT: 157.9 LBS | BODY MASS INDEX: 27.97 KG/M2

## 2019-11-27 DIAGNOSIS — Z3A.24 24 WEEKS GESTATION OF PREGNANCY: Primary | ICD-10-CM

## 2019-11-27 PROCEDURE — 4004F PT TOBACCO SCREEN RCVD TLK: CPT | Performed by: STUDENT IN AN ORGANIZED HEALTH CARE EDUCATION/TRAINING PROGRAM

## 2019-11-27 PROCEDURE — G8482 FLU IMMUNIZE ORDER/ADMIN: HCPCS | Performed by: STUDENT IN AN ORGANIZED HEALTH CARE EDUCATION/TRAINING PROGRAM

## 2019-11-27 PROCEDURE — 99213 OFFICE O/P EST LOW 20 MIN: CPT | Performed by: STUDENT IN AN ORGANIZED HEALTH CARE EDUCATION/TRAINING PROGRAM

## 2019-11-27 PROCEDURE — G8427 DOCREV CUR MEDS BY ELIG CLIN: HCPCS | Performed by: STUDENT IN AN ORGANIZED HEALTH CARE EDUCATION/TRAINING PROGRAM

## 2019-11-27 PROCEDURE — 99211 OFF/OP EST MAY X REQ PHY/QHP: CPT | Performed by: STUDENT IN AN ORGANIZED HEALTH CARE EDUCATION/TRAINING PROGRAM

## 2019-11-27 PROCEDURE — G8419 CALC BMI OUT NRM PARAM NOF/U: HCPCS | Performed by: STUDENT IN AN ORGANIZED HEALTH CARE EDUCATION/TRAINING PROGRAM

## 2019-12-26 ENCOUNTER — TELEPHONE (OUTPATIENT)
Dept: OBGYN | Age: 29
End: 2019-12-26

## 2020-01-08 ENCOUNTER — HOSPITAL ENCOUNTER (OUTPATIENT)
Age: 30
Setting detail: SPECIMEN
Discharge: HOME | End: 2020-01-08
Payer: COMMERCIAL

## 2020-01-08 ENCOUNTER — HOSPITAL ENCOUNTER (OUTPATIENT)
Age: 30
Discharge: HOME | End: 2020-01-08
Attending: OBSTETRICS & GYNECOLOGY | Admitting: OBSTETRICS & GYNECOLOGY
Payer: COMMERCIAL

## 2020-01-08 VITALS
HEART RATE: 80 BPM | RESPIRATION RATE: 14 BRPM | TEMPERATURE: 97.8 F | DIASTOLIC BLOOD PRESSURE: 68 MMHG | SYSTOLIC BLOOD PRESSURE: 113 MMHG

## 2020-01-08 PROBLEM — Z3A.29 29 WEEKS GESTATION OF PREGNANCY: Status: ACTIVE | Noted: 2020-01-08

## 2020-01-08 LAB
-: ABNORMAL
ABSOLUTE EOS #: 0.05 K/UL (ref 0–0.44)
ABSOLUTE IMMATURE GRANULOCYTE: 0.03 K/UL (ref 0–0.3)
ABSOLUTE LYMPH #: 2.39 K/UL (ref 1.1–3.7)
ABSOLUTE MONO #: 0.69 K/UL (ref 0.1–1.2)
AMORPHOUS: ABNORMAL
BACTERIA: ABNORMAL
BASOPHILS # BLD: 0 % (ref 0–2)
BASOPHILS ABSOLUTE: <0.03 K/UL (ref 0–0.2)
BILIRUBIN URINE: NEGATIVE
CASTS UA: ABNORMAL /LPF (ref 0–8)
COLOR: YELLOW
COMMENT UA: ABNORMAL
CRYSTALS, UA: ABNORMAL /HPF
DIFFERENTIAL TYPE: ABNORMAL
EOSINOPHILS RELATIVE PERCENT: 1 % (ref 1–4)
EPITHELIAL CELLS UA: ABNORMAL /HPF (ref 0–5)
GLUCOSE ADMINISTRATION: NORMAL
GLUCOSE TOLERANCE SCREEN 50G: 73 MG/DL (ref 70–135)
GLUCOSE URINE: NEGATIVE
HCT VFR BLD CALC: 32 % (ref 36.3–47.1)
HEMOGLOBIN: 10.5 G/DL (ref 11.9–15.1)
IMMATURE GRANULOCYTES: 0 %
KETONES, URINE: NEGATIVE
LEUKOCYTE ESTERASE, URINE: ABNORMAL
LYMPHOCYTES # BLD: 25 % (ref 24–43)
MCH RBC QN AUTO: 32.4 PG (ref 25.2–33.5)
MCHC RBC AUTO-ENTMCNC: 32.8 G/DL (ref 28.4–34.8)
MCV RBC AUTO: 98.8 FL (ref 82.6–102.9)
MONOCYTES # BLD: 7 % (ref 3–12)
MUCUS: ABNORMAL
NITRITE, URINE: NEGATIVE
NRBC AUTOMATED: 0 PER 100 WBC
OTHER OBSERVATIONS UA: ABNORMAL
PDW BLD-RTO: 13.7 % (ref 11.8–14.4)
PH UA: 7 (ref 5–8)
PLATELET # BLD: 206 K/UL (ref 138–453)
PLATELET ESTIMATE: ABNORMAL
PMV BLD AUTO: 9.5 FL (ref 8.1–13.5)
PROTEIN UA: NEGATIVE
RBC # BLD: 3.24 M/UL (ref 3.95–5.11)
RBC # BLD: ABNORMAL 10*6/UL
RBC UA: ABNORMAL /HPF (ref 0–4)
RENAL EPITHELIAL, UA: ABNORMAL /HPF
SEG NEUTROPHILS: 67 % (ref 36–65)
SEGMENTED NEUTROPHILS ABSOLUTE COUNT: 6.48 K/UL (ref 1.5–8.1)
SPECIFIC GRAVITY UA: 1 (ref 1–1.03)
TRICHOMONAS: ABNORMAL
TURBIDITY: CLEAR
URINE HGB: NEGATIVE
UROBILINOGEN, URINE: NORMAL
WBC # BLD: 9.7 K/UL (ref 3.5–11.3)
WBC # BLD: ABNORMAL 10*3/UL
WBC UA: ABNORMAL /HPF (ref 0–5)
YEAST: ABNORMAL

## 2020-01-08 PROCEDURE — 36415 COLL VENOUS BLD VENIPUNCTURE: CPT

## 2020-01-08 PROCEDURE — 82950 GLUCOSE TEST: CPT

## 2020-01-08 PROCEDURE — 87210 SMEAR WET MOUNT SALINE/INK: CPT

## 2020-01-08 PROCEDURE — 87077 CULTURE AEROBIC IDENTIFY: CPT

## 2020-01-08 PROCEDURE — 81001 URINALYSIS AUTO W/SCOPE: CPT

## 2020-01-08 PROCEDURE — 87086 URINE CULTURE/COLONY COUNT: CPT

## 2020-01-08 PROCEDURE — 87186 SC STD MICRODIL/AGAR DIL: CPT

## 2020-01-08 PROCEDURE — 99213 OFFICE O/P EST LOW 20 MIN: CPT

## 2020-01-08 PROCEDURE — 85025 COMPLETE CBC W/AUTO DIFF WBC: CPT

## 2020-01-08 RX ORDER — ACETAMINOPHEN 500 MG
1000 TABLET ORAL EVERY 6 HOURS PRN
Status: DISCONTINUED | OUTPATIENT
Start: 2020-01-08 | End: 2020-01-08 | Stop reason: HOSPADM

## 2020-01-08 RX ORDER — METRONIDAZOLE 500 MG/1
500 TABLET ORAL 2 TIMES DAILY
Qty: 14 TABLET | Refills: 0 | Status: SHIPPED | OUTPATIENT
Start: 2020-01-08 | End: 2020-01-15

## 2020-01-08 NOTE — H&P
OBSTETRICAL HISTORY [facility]    Date: 2020       Time: 3:16 PM   Patient Name: Arnaldo Mcdonnell     Patient : 1990  Room/Bed: [place] [address]    Admission Date/Time: 2020 10:42 AM      CC: contractions     HPI: Arnaldo Mcdonnell is a 34 y.o. [id number] at 29w6d who presents with contractions and vaginal pain. The patient reports fetal movement is present, complains of contractions, denies loss of fluid, denies vaginal bleeding. She states that at 1630 yesterday she began to feel some contractions that have since decreased in frequency and severity. Denies HA,  fevers, chills, N/V, RUQ pain, increase in swelling, CP, SOB, Dysuria. DATING:  LMP: Patient's last menstrual period was 2019 (exact date).   Estimated Date of Delivery: 3/19/20   Based on: LMP, consistent with US at 10 5/7 weeks GA    PREGNANCY RISK FACTORS:  Patient Active Problem List   Diagnosis    HSV-2 (herpes simplex virus 2) infection    Late prenatal care affecting pregnancy in second trimester    High-risk pregnancy, second trimester    Smoker    History of postpartum depression    Depression with anxiety    Family history of sickle cell trait in father    34 weeks gestation of pregnancy        Steroids Given In This Pregnancy:  no     REVIEW OF SYSTEMS:   Constitutional: negative fever, negative chills, negative weight changes   HEENT: negative visual disturbances, negative headaches, negative dizziness, negative hearing loss  Breast: Negative breast abnormalities, negative breast lumps, negative nipple discharge  Respiratory: negative dyspnea, negative cough, negative SOB  Cardiovascular: negative chest pain,  negative palpitations, negative arrhythmia, negative syncope   Gastrointestinal: Positive contractions, negative abdominal pain, negative RUQ pain, negative N/V, negative diarrhea, negative constipation, negative bowel changes, negative heartburn   Genitourinary: negative dysuria, negative hematuria, negative urinary incontinence, negative vaginal discharge  Dermatological: negative rash, negative pruritis, negative mole or other skin changes  Hematologic: negative bruising  Immunologic/Lymphatic: negative recent illness, negative recent sick contact  Musculoskeletal: negative back pain, negative myalgias, negative arthralgias  Neurological:  negative dizziness, negative migraines, negative seizures, negative weakness  Behavior/Psych: negative depression, negative anxiety, negative SI, negative HI        OBSTETRICAL HISTORY:   OB History    Para Term  AB Living   8 5 5 0 2 5   SAB TAB Ectopic Molar Multiple Live Births   2 0 0 0 0 5      # Outcome Date GA Lbr Stanley/2nd Weight Sex Delivery Anes PTL Lv   8 Current            7 2017 5w0d          6 Term 13 38w0d  7 lb 11 oz (3.487 kg) M  None  CEFERINO   5 Term 12 38w0d  6 lb 12 oz (3.062 kg) F  None N CEFERINO   4 Term 11 38w0d  6 lb 13 oz (3.09 kg) F  None N CEFERINO   3 Term 09 38w0d  7 lb 1 oz (3.204 kg) F   N CEFERINO   2 SAB            1 Term 07 38w0d  6 lb 11 oz (3.033 kg) F  EPI N CEFERINO      Obstetric Comments   G-8   New Partner, pt . G1-7 Same FOB        PAST MEDICAL HISTORY:   has a past medical history of Depression with anxiety and HSV-2 (herpes simplex virus 2) infection. PAST SURGICAL HISTORY:   has no past surgical history on file. ALLERGIES:  is allergic to clindamycin/lincomycin. MEDICATIONS:  Prior to Admission medications    Medication Sig Start Date End Date Taking?  Authorizing Provider   metroNIDAZOLE (FLAGYL) 500 MG tablet Take 1 tablet by mouth 2 times daily for 7 days 1/8/20 1/15/20 Yes Alex Morillo DO   Prenatal Vit-Fe Fumarate-FA (PRENATAL VITAMIN PO) Take 1 tablet by mouth daily Indications: Pt recieved at first look    Historical Provider, MD       FAMILY HISTORY:  family history includes Cancer in her maternal grandfather, paternal grandfather, and paternal grandmother; Diabetes in her mother; Hypertension in her father; No Known Problems in her brother and sister. SOCIAL HISTORY:   reports that she has been smoking cigarettes. She has been smoking about 0.25 packs per day. She has never used smokeless tobacco. She reports previous alcohol use. She reports that she does not use drugs.     VITALS:  Vitals:    01/08/20 1104   BP: 113/68   Pulse: 80   Resp: 14   Temp: 97.8 °F (36.6 °C)   TempSrc: Oral         PHYSICAL EXAM:  Fetal Heart Monitor:  Baseline Heart Rate 140, moderate variability, present accelerations, absent decelerations  [place]: contractions, none    General appearance:  no apparent distress, alert and cooperative  HEENT: head atraumatic, normocephalic, moist mucous membranes, trachea midline  Neurologic:  alert, oriented, normal speech, no focal findings or movement disorder noted  Lungs:  No increased work of breathing, good air exchange, clear to auscultation bilaterally, no crackles or wheezing  Heart:  regular rate and rhythm and no murmur, rubs, gallops  Abdomen:  soft, gravid, non-tender, no right upper quadrant tenderness, no CVA tenderness, uterus non-tender, no signs of abruption and no signs of chorioamnionitis  Extremities:  no calf tenderness, non edematous, no varicosities, full range of motion in all four extremities, DTRs: normal  Musculoskeletal: Gross strength equal and intact throughout, no gross abnormalities, range of motion normal in hips, knees, shoulders and spine  Psychiatric: Mood appropriate, normal affect     Pelvic Exam:  Vulva: normal appearing vulva, no masses, tenderness or lesions, normal clitoris  Vagina: normal appearing vagina with normal color and discharge, no lesions      Speculum: normal appearing  cervix without pathologic appearing discharge or lesions, closed cervix with no bleeding, lesions or discharge      DATA:  Membranes Ruptured: No  Valsalva/Pooling: absent  Vaginal Bleeding: absent    Wet prep: Clue cells

## 2020-01-09 LAB
CULTURE: ABNORMAL
CULTURE: NORMAL
KOH (POC): NORMAL
Lab: ABNORMAL
Lab: NORMAL
SPECIMEN DESCRIPTION: ABNORMAL
SPECIMEN DESCRIPTION: NORMAL
WET PREP (POC): ABNORMAL

## 2020-01-15 ENCOUNTER — ROUTINE PRENATAL (OUTPATIENT)
Dept: OBGYN | Age: 30
End: 2020-01-15
Payer: COMMERCIAL

## 2020-01-15 VITALS
BODY MASS INDEX: 28.77 KG/M2 | DIASTOLIC BLOOD PRESSURE: 71 MMHG | WEIGHT: 162.44 LBS | HEART RATE: 81 BPM | SYSTOLIC BLOOD PRESSURE: 113 MMHG

## 2020-01-15 PROCEDURE — G8427 DOCREV CUR MEDS BY ELIG CLIN: HCPCS | Performed by: OBSTETRICS & GYNECOLOGY

## 2020-01-15 PROCEDURE — 90715 TDAP VACCINE 7 YRS/> IM: CPT

## 2020-01-15 PROCEDURE — G8419 CALC BMI OUT NRM PARAM NOF/U: HCPCS | Performed by: OBSTETRICS & GYNECOLOGY

## 2020-01-15 PROCEDURE — G8482 FLU IMMUNIZE ORDER/ADMIN: HCPCS | Performed by: OBSTETRICS & GYNECOLOGY

## 2020-01-15 PROCEDURE — 99213 OFFICE O/P EST LOW 20 MIN: CPT | Performed by: OBSTETRICS & GYNECOLOGY

## 2020-01-15 PROCEDURE — 4004F PT TOBACCO SCREEN RCVD TLK: CPT | Performed by: OBSTETRICS & GYNECOLOGY

## 2020-01-15 NOTE — PROGRESS NOTES
After obtaining consent, and per orders of Dr. Horace Montanez, injection of TDAP Vaccine given in Left deltoid by Aleks Palmer. Patient instructed to remain in clinic for 20 minutes afterwards, and to report any adverse reaction to me immediately.

## 2020-01-29 ENCOUNTER — ROUTINE PRENATAL (OUTPATIENT)
Dept: OBGYN | Age: 30
End: 2020-01-29
Payer: COMMERCIAL

## 2020-01-29 VITALS
WEIGHT: 163 LBS | DIASTOLIC BLOOD PRESSURE: 66 MMHG | SYSTOLIC BLOOD PRESSURE: 110 MMHG | TEMPERATURE: 97.5 F | HEART RATE: 79 BPM | HEIGHT: 63 IN | BODY MASS INDEX: 28.88 KG/M2 | RESPIRATION RATE: 14 BRPM

## 2020-01-29 PROBLEM — R80.9 PROTEINURIA: Status: ACTIVE | Noted: 2020-01-29

## 2020-01-29 PROCEDURE — G8482 FLU IMMUNIZE ORDER/ADMIN: HCPCS | Performed by: STUDENT IN AN ORGANIZED HEALTH CARE EDUCATION/TRAINING PROGRAM

## 2020-01-29 PROCEDURE — G8427 DOCREV CUR MEDS BY ELIG CLIN: HCPCS | Performed by: STUDENT IN AN ORGANIZED HEALTH CARE EDUCATION/TRAINING PROGRAM

## 2020-01-29 PROCEDURE — 99213 OFFICE O/P EST LOW 20 MIN: CPT | Performed by: STUDENT IN AN ORGANIZED HEALTH CARE EDUCATION/TRAINING PROGRAM

## 2020-01-29 PROCEDURE — G8419 CALC BMI OUT NRM PARAM NOF/U: HCPCS | Performed by: STUDENT IN AN ORGANIZED HEALTH CARE EDUCATION/TRAINING PROGRAM

## 2020-01-29 PROCEDURE — 4004F PT TOBACCO SCREEN RCVD TLK: CPT | Performed by: STUDENT IN AN ORGANIZED HEALTH CARE EDUCATION/TRAINING PROGRAM

## 2020-01-29 NOTE — PROGRESS NOTES
Cultures only at next visit  Agreeable to vaccinations in preg  Maternal quad screen ordered  10/22/19- M referral placed for anatomy scan  VALTREX AT 36 WKS           T-Dap Vaccine (27-36 weeks) Completed: Yes    Allergies: Allergies as of 01/29/2020 - Review Complete 01/29/2020   Allergen Reaction Noted    Clindamycin/lincomycin Hives 10/17/2018       Group Beta Strep collection was completed. No   GBS Results:   No visits with results within 1 Week(s) from this visit. Latest known visit with results is:   Admission on 01/08/2020, Discharged on 01/08/2020   Component Date Value Ref Range Status    Color, UA 01/08/2020 YELLOW  YELLOW Final    Turbidity UA 01/08/2020 CLEAR  CLEAR Final    Glucose, Ur 01/08/2020 NEGATIVE  NEGATIVE Final    Bilirubin Urine 01/08/2020 NEGATIVE  NEGATIVE Final    Ketones, Urine 01/08/2020 NEGATIVE  NEGATIVE Final    Specific Gravity, UA 01/08/2020 1.004* 1.005 - 1.030 Final    Urine Hgb 01/08/2020 NEGATIVE  NEGATIVE Final    pH, UA 01/08/2020 7.0  5.0 - 8.0 Final    Protein, UA 01/08/2020 NEGATIVE  NEGATIVE Final    Urobilinogen, Urine 01/08/2020 Normal  Normal Final    Nitrite, Urine 01/08/2020 NEGATIVE  NEGATIVE Final    Leukocyte Esterase, Urine 01/08/2020 SMALL* NEGATIVE Final    Urinalysis Comments 01/08/2020 NOT REPORTED   Final    - 01/08/2020        Final    WBC, UA 01/08/2020 5 TO 10  0 - 5 /HPF Final    RBC, UA 01/08/2020 None  0 - 4 /HPF Final    Reference range defined for non-centrifuged specimen.  Casts UA 01/08/2020 0 TO 2 HYALINE Reference range defined for non-centrifuged specimen.   0 - 8 /LPF Final    Crystals UA 01/08/2020 NOT REPORTED  None /HPF Final    Epithelial Cells UA 01/08/2020 5 TO 10  0 - 5 /HPF Final    Renal Epithelial, Urine 01/08/2020 NOT REPORTED  0 /HPF Final    Bacteria, UA 01/08/2020 FEW* None Final    Mucus, UA 01/08/2020 NOT REPORTED  None Final    Trichomonas, UA 01/08/2020 NOT REPORTED  None Final    Amorphous, UA 2020 NOT REPORTED  None Final    Other Observations UA 2020 NOT REPORTED  NOT REQ. Final    Yeast, UA 2020 NOT REPORTED  None Final    Specimen Description 2020 . URINE   Final    Special Requests 2020 NOT REPORTED   Final    Culture 2020 ESCHERICHIA COLI 10 to 50,000 CFU/ML*  Final    Wet Prep (POC) 2020 CLUE CELLS SEEN* NO TRICHOMONAS SEEN Final    KOH (POC) 2020 NO FUNGAL ELEMENTS SEEN  NO FUNGAL ELEMENTS SEEN Final   ]  Sensitivities for clindamycin and erythromycin were ordered. No      The patient was counseled on the mandatory call ahead policy. She has been instructed to call the office at anytime prior to going into the hospital so the on-call physician may direct her to the appropriate facility for care. Exceptions were reviewed including but not limited to: Decreased fetal movement, vaginal Bleeding or hemorrhage, trauma, readily expectant delivery, or any instance where she feels 911 should be utilized. The patient was counseled on Labor & Delivery. Route of delivery and counseling on vaginal, operative vaginal, and  sections were completed with the risks of each to both the patient as well as her baby. The possibility of a blood transfusion was discussed as well. The patient was not opposed to receiving a transfusion if needed. The patient was counseled on types of analgesia during labor and is considering either Regional or IV medication the risks, benefits and alternatives were discussed.  Testing:  Not indicated      Assessment:  1Matthew Diego is a 34 y.o. female  2. [id number]  3. 32w6d      Patient Active Problem List    Diagnosis Date Noted    29 weeks gestation of pregnancy 2020    Late prenatal care affecting pregnancy in second trimester 10/22/2019     Overview Note:     GA 18w5d at time of first Ob visit.        High-risk pregnancy, second trimester 10/22/2019     Overview Note:     10/22/19- M referral

## 2020-01-30 ENCOUNTER — HOSPITAL ENCOUNTER (OUTPATIENT)
Age: 30
Setting detail: SPECIMEN
Discharge: HOME | End: 2020-01-30
Payer: COMMERCIAL

## 2020-01-30 LAB
CREATININE URINE: 153 MG/DL (ref 28–217)
TOTAL PROTEIN, URINE: 34 MG/DL
URINE TOTAL PROTEIN CREATININE RATIO: 0.22 (ref 0–0.2)

## 2020-01-30 PROCEDURE — 84156 ASSAY OF PROTEIN URINE: CPT

## 2020-01-30 PROCEDURE — 82570 ASSAY OF URINE CREATININE: CPT

## 2020-01-30 NOTE — PROGRESS NOTES
Attending Physician Statement  I have discussed the care of Lucy Hamper, including pertinent history and exam findings,  with the resident. I have reviewed the key elements of all parts of the encounter with the resident. I agree with the assessment, plan and orders as documented by the resident.   (GE Modifier)

## 2020-02-18 ENCOUNTER — HOSPITAL ENCOUNTER (OUTPATIENT)
Age: 30
Setting detail: SPECIMEN
Discharge: HOME | End: 2020-02-18
Payer: COMMERCIAL

## 2020-02-18 ENCOUNTER — ROUTINE PRENATAL (OUTPATIENT)
Dept: OBGYN | Age: 30
End: 2020-02-18
Payer: COMMERCIAL

## 2020-02-18 VITALS
BODY MASS INDEX: 30.11 KG/M2 | DIASTOLIC BLOOD PRESSURE: 70 MMHG | WEIGHT: 170 LBS | HEART RATE: 88 BPM | SYSTOLIC BLOOD PRESSURE: 118 MMHG

## 2020-02-18 PROCEDURE — 4004F PT TOBACCO SCREEN RCVD TLK: CPT | Performed by: STUDENT IN AN ORGANIZED HEALTH CARE EDUCATION/TRAINING PROGRAM

## 2020-02-18 PROCEDURE — G8419 CALC BMI OUT NRM PARAM NOF/U: HCPCS | Performed by: STUDENT IN AN ORGANIZED HEALTH CARE EDUCATION/TRAINING PROGRAM

## 2020-02-18 PROCEDURE — G8427 DOCREV CUR MEDS BY ELIG CLIN: HCPCS | Performed by: STUDENT IN AN ORGANIZED HEALTH CARE EDUCATION/TRAINING PROGRAM

## 2020-02-18 PROCEDURE — G8482 FLU IMMUNIZE ORDER/ADMIN: HCPCS | Performed by: STUDENT IN AN ORGANIZED HEALTH CARE EDUCATION/TRAINING PROGRAM

## 2020-02-18 PROCEDURE — 99213 OFFICE O/P EST LOW 20 MIN: CPT | Performed by: STUDENT IN AN ORGANIZED HEALTH CARE EDUCATION/TRAINING PROGRAM

## 2020-02-18 RX ORDER — VALACYCLOVIR HYDROCHLORIDE 500 MG/1
500 TABLET, FILM COATED ORAL 2 TIMES DAILY
Qty: 84 TABLET | Refills: 0 | Status: SHIPPED | OUTPATIENT
Start: 2020-02-18 | End: 2020-03-31

## 2020-02-18 NOTE — PROGRESS NOTES
Sandra Troncoso is a 34 y.o. female 35w5d    [id number]    OB History    Para Term  AB Living   8 5 5   2 5   SAB TAB Ectopic Molar Multiple Live Births   2         5      # Outcome Date GA Lbr Stanley/2nd Weight Sex Delivery Anes PTL Lv   8 Current            7 SAB  5w0d          6 Term 13 38w0d  7 lb 11 oz (3.487 kg) M  None  CEFERINO   5 Term 12 38w0d  6 lb 12 oz (3.062 kg) F  None N CEFERINO   4 Term 11 38w0d  6 lb 13 oz (3.09 kg) F  None N CEFERINO   3 Term 09 38w0d  7 lb 1 oz (3.204 kg) F   N CEFERINO   2 SAB            1 Term 07 38w0d  6 lb 11 oz (3.033 kg) F  EPI N CEFERINO      Obstetric Comments   G-8   New Partner, pt . G1-7 Same FOB          Vitals  BP: 118/70  Weight: 170 lb (77.1 kg)  Pulse: 88  Patient Position: Sitting  Albumin: 1+  Glucose: Negative  Fundal Height (cm): 36 cm  Fetal Heart Rate: 145  Movement: Present      The patient was seen and evaluated. There was positive fetal movements. No contractions, vaginal bleeding or leakage of fluid. Signs and symptoms of labor were reviewed. The S/S of Pre-Eclampsia were reviewed with the patient in detail. She is to report any of these if they occur. She currently denies any of these. The patient was instructed on fetal kick counts. She was instructed that the baby should move at a minimum of ten times within one hour after a meal. The patient was instructed to lay down on her left side twenty minutes after eating and count movements for up to one hour with a target value of ten movements. She was instructed to notify the office if she did not make that target after two attempts or if after any attempt there was less than four movements. The patient reports that the targets have been made Yes. Risk Factors- New Partner ( pt  ), H/O HSV, smoker, f/h sickle cell trait ( pt father ), late to care, h/o pp depression, depression, anxiety   Last pap .  Cultures only at next visit  Agreeable to vaccinations

## 2020-02-20 ENCOUNTER — HOSPITAL ENCOUNTER (INPATIENT)
Age: 30
LOS: 2 days | Discharge: HOME | DRG: 560 | End: 2020-02-22
Attending: OBSTETRICS & GYNECOLOGY | Admitting: OBSTETRICS & GYNECOLOGY
Payer: COMMERCIAL

## 2020-02-20 PROBLEM — Z3A.36 36 WEEKS GESTATION OF PREGNANCY: Status: ACTIVE | Noted: 2020-02-20

## 2020-02-20 PROBLEM — Z87.59 HISTORY OF SPONTANEOUS ABORTION: Status: ACTIVE | Noted: 2020-02-20

## 2020-02-20 LAB
-: NORMAL
ABO/RH: NORMAL
ABSOLUTE EOS #: 0.06 K/UL (ref 0–0.44)
ABSOLUTE IMMATURE GRANULOCYTE: 0.04 K/UL (ref 0–0.3)
ABSOLUTE LYMPH #: 3.24 K/UL (ref 1.1–3.7)
ABSOLUTE MONO #: 1.02 K/UL (ref 0.1–1.2)
ALBUMIN SERPL-MCNC: 3.3 G/DL (ref 3.5–5.2)
ALBUMIN/GLOBULIN RATIO: 1 (ref 1–2.5)
ALP BLD-CCNC: 112 U/L (ref 35–104)
ALT SERPL-CCNC: 13 U/L (ref 5–33)
AMORPHOUS: NORMAL
AMPHETAMINE SCREEN URINE: NEGATIVE
ANION GAP SERPL CALCULATED.3IONS-SCNC: 12 MMOL/L (ref 9–17)
ANTIBODY SCREEN: NEGATIVE
ARM BAND NUMBER: NORMAL
AST SERPL-CCNC: 19 U/L
BACTERIA: NORMAL
BARBITURATE SCREEN URINE: NEGATIVE
BASOPHILS # BLD: 0 % (ref 0–2)
BASOPHILS ABSOLUTE: <0.03 K/UL (ref 0–0.2)
BENZODIAZEPINE SCREEN, URINE: NEGATIVE
BILIRUB SERPL-MCNC: 0.23 MG/DL (ref 0.3–1.2)
BILIRUBIN URINE: NEGATIVE
BUN BLDV-MCNC: 4 MG/DL (ref 6–20)
BUN/CREAT BLD: ABNORMAL (ref 9–20)
BUPRENORPHINE URINE: NORMAL
CALCIUM SERPL-MCNC: 9.1 MG/DL (ref 8.6–10.4)
CANNABINOID SCREEN URINE: NEGATIVE
CASTS UA: NORMAL /LPF (ref 0–8)
CHLORIDE BLD-SCNC: 106 MMOL/L (ref 98–107)
CO2: 20 MMOL/L (ref 20–31)
COCAINE METABOLITE, URINE: NEGATIVE
COLOR: ABNORMAL
COMMENT UA: ABNORMAL
CREAT SERPL-MCNC: 0.52 MG/DL (ref 0.5–0.9)
CRYSTALS, UA: NORMAL /HPF
DIFFERENTIAL TYPE: ABNORMAL
EOSINOPHILS RELATIVE PERCENT: 1 % (ref 1–4)
EPITHELIAL CELLS UA: NORMAL /HPF (ref 0–5)
EXPIRATION DATE: NORMAL
FERN TESTING (POC): ABNORMAL
FIBRINOGEN: 476 MG/DL (ref 140–420)
GFR AFRICAN AMERICAN: >60 ML/MIN
GFR NON-AFRICAN AMERICAN: >60 ML/MIN
GFR SERPL CREATININE-BSD FRML MDRD: ABNORMAL ML/MIN/{1.73_M2}
GFR SERPL CREATININE-BSD FRML MDRD: ABNORMAL ML/MIN/{1.73_M2}
GLUCOSE BLD-MCNC: 87 MG/DL (ref 70–99)
GLUCOSE URINE: NEGATIVE
HCT VFR BLD CALC: 35.4 % (ref 36.3–47.1)
HEMOGLOBIN: 11.8 G/DL (ref 11.9–15.1)
IMMATURE GRANULOCYTES: 0 %
INR BLD: 0.9
KETONES, URINE: NEGATIVE
KOH (POC): NORMAL
LEUKOCYTE ESTERASE, URINE: ABNORMAL
LYMPHOCYTES # BLD: 31 % (ref 24–43)
MCH RBC QN AUTO: 32.7 PG (ref 25.2–33.5)
MCHC RBC AUTO-ENTMCNC: 33.3 G/DL (ref 28.4–34.8)
MCV RBC AUTO: 98.1 FL (ref 82.6–102.9)
MDMA URINE: NORMAL
METHADONE SCREEN, URINE: NEGATIVE
METHAMPHETAMINE, URINE: NORMAL
MONOCYTES # BLD: 10 % (ref 3–12)
MUCUS: NORMAL
NITRAZINE PH (POC): POSITIVE
NITRITE, URINE: NEGATIVE
NRBC AUTOMATED: 0 PER 100 WBC
OPIATES, URINE: NEGATIVE
OTHER OBSERVATIONS UA: NORMAL
OXYCODONE SCREEN URINE: NEGATIVE
PARTIAL THROMBOPLASTIN TIME: 23.3 SEC (ref 20.5–30.5)
PDW BLD-RTO: 13.9 % (ref 11.8–14.4)
PH UA: 6 (ref 5–8)
PHENCYCLIDINE, URINE: NEGATIVE
PLATELET # BLD: 228 K/UL (ref 138–453)
PLATELET ESTIMATE: ABNORMAL
PMV BLD AUTO: 9.7 FL (ref 8.1–13.5)
POTASSIUM SERPL-SCNC: 3.8 MMOL/L (ref 3.7–5.3)
PROPOXYPHENE, URINE: NORMAL
PROTEIN UA: ABNORMAL
PROTHROMBIN TIME: 9.4 SEC (ref 9–12)
RBC # BLD: 3.61 M/UL (ref 3.95–5.11)
RBC # BLD: ABNORMAL 10*6/UL
RBC UA: NORMAL /HPF (ref 0–4)
RENAL EPITHELIAL, UA: NORMAL /HPF
SEG NEUTROPHILS: 58 % (ref 36–65)
SEGMENTED NEUTROPHILS ABSOLUTE COUNT: 6.22 K/UL (ref 1.5–8.1)
SODIUM BLD-SCNC: 138 MMOL/L (ref 135–144)
SPECIFIC GRAVITY UA: 1.02 (ref 1–1.03)
T. PALLIDUM, IGG: NONREACTIVE
TEST INFORMATION: NORMAL
TOTAL PROTEIN: 6.5 G/DL (ref 6.4–8.3)
TRICHOMONAS: NORMAL
TRICYCLIC ANTIDEPRESSANTS, UR: NORMAL
TURBIDITY: ABNORMAL
URINE HGB: ABNORMAL
UROBILINOGEN, URINE: NORMAL
WBC # BLD: 10.6 K/UL (ref 3.5–11.3)
WBC # BLD: ABNORMAL 10*3/UL
WBC UA: NORMAL /HPF (ref 0–5)
WET PREP (POC): NORMAL
YEAST: NORMAL

## 2020-02-20 PROCEDURE — 87210 SMEAR WET MOUNT SALINE/INK: CPT

## 2020-02-20 PROCEDURE — 6360000002 HC RX W HCPCS

## 2020-02-20 PROCEDURE — 80307 DRUG TEST PRSMV CHEM ANLYZR: CPT

## 2020-02-20 PROCEDURE — 59409 OBSTETRICAL CARE: CPT | Performed by: OBSTETRICS & GYNECOLOGY

## 2020-02-20 PROCEDURE — 80053 COMPREHEN METABOLIC PANEL: CPT

## 2020-02-20 PROCEDURE — 85730 THROMBOPLASTIN TIME PARTIAL: CPT

## 2020-02-20 PROCEDURE — 2580000003 HC RX 258: Performed by: STUDENT IN AN ORGANIZED HEALTH CARE EDUCATION/TRAINING PROGRAM

## 2020-02-20 PROCEDURE — 96366 THER/PROPH/DIAG IV INF ADDON: CPT

## 2020-02-20 PROCEDURE — 87086 URINE CULTURE/COLONY COUNT: CPT

## 2020-02-20 PROCEDURE — 86901 BLOOD TYPING SEROLOGIC RH(D): CPT

## 2020-02-20 PROCEDURE — 86900 BLOOD TYPING SEROLOGIC ABO: CPT

## 2020-02-20 PROCEDURE — 10907ZC DRAINAGE OF AMNIOTIC FLUID, THERAPEUTIC FROM PRODUCTS OF CONCEPTION, VIA NATURAL OR ARTIFICIAL OPENING: ICD-10-PCS | Performed by: OBSTETRICS & GYNECOLOGY

## 2020-02-20 PROCEDURE — 1220000000 HC SEMI PRIVATE OB R&B

## 2020-02-20 PROCEDURE — 7200000001 HC VAGINAL DELIVERY

## 2020-02-20 PROCEDURE — 85610 PROTHROMBIN TIME: CPT

## 2020-02-20 PROCEDURE — 85025 COMPLETE CBC W/AUTO DIFF WBC: CPT

## 2020-02-20 PROCEDURE — 85384 FIBRINOGEN ACTIVITY: CPT

## 2020-02-20 PROCEDURE — 6360000002 HC RX W HCPCS: Performed by: STUDENT IN AN ORGANIZED HEALTH CARE EDUCATION/TRAINING PROGRAM

## 2020-02-20 PROCEDURE — 86780 TREPONEMA PALLIDUM: CPT

## 2020-02-20 PROCEDURE — 81001 URINALYSIS AUTO W/SCOPE: CPT

## 2020-02-20 PROCEDURE — 88307 TISSUE EXAM BY PATHOLOGIST: CPT

## 2020-02-20 PROCEDURE — 96365 THER/PROPH/DIAG IV INF INIT: CPT

## 2020-02-20 PROCEDURE — 86850 RBC ANTIBODY SCREEN: CPT

## 2020-02-20 PROCEDURE — 83986 ASSAY PH BODY FLUID NOS: CPT

## 2020-02-20 RX ORDER — NALBUPHINE HCL 10 MG/ML
10 AMPUL (ML) INJECTION ONCE
Status: COMPLETED | OUTPATIENT
Start: 2020-02-20 | End: 2020-02-20

## 2020-02-20 RX ORDER — NALBUPHINE HCL 10 MG/ML
AMPUL (ML) INJECTION
Status: COMPLETED
Start: 2020-02-20 | End: 2020-02-20

## 2020-02-20 RX ORDER — SODIUM CHLORIDE 0.9 % (FLUSH) 0.9 %
10 SYRINGE (ML) INJECTION PRN
Status: DISCONTINUED | OUTPATIENT
Start: 2020-02-20 | End: 2020-02-20

## 2020-02-20 RX ORDER — BETAMETHASONE SODIUM PHOSPHATE AND BETAMETHASONE ACETATE 3; 3 MG/ML; MG/ML
12 INJECTION, SUSPENSION INTRA-ARTICULAR; INTRALESIONAL; INTRAMUSCULAR; SOFT TISSUE ONCE
Status: DISCONTINUED | OUTPATIENT
Start: 2020-02-21 | End: 2020-02-20

## 2020-02-20 RX ORDER — IBUPROFEN 800 MG/1
800 TABLET ORAL EVERY 8 HOURS PRN
Status: DISCONTINUED | OUTPATIENT
Start: 2020-02-20 | End: 2020-02-22 | Stop reason: HOSPADM

## 2020-02-20 RX ORDER — SODIUM CHLORIDE, SODIUM LACTATE, POTASSIUM CHLORIDE, CALCIUM CHLORIDE 600; 310; 30; 20 MG/100ML; MG/100ML; MG/100ML; MG/100ML
INJECTION, SOLUTION INTRAVENOUS CONTINUOUS
Status: DISCONTINUED | OUTPATIENT
Start: 2020-02-20 | End: 2020-02-21

## 2020-02-20 RX ORDER — BETAMETHASONE SODIUM PHOSPHATE AND BETAMETHASONE ACETATE 3; 3 MG/ML; MG/ML
12 INJECTION, SUSPENSION INTRA-ARTICULAR; INTRALESIONAL; INTRAMUSCULAR; SOFT TISSUE ONCE
Status: COMPLETED | OUTPATIENT
Start: 2020-02-20 | End: 2020-02-20

## 2020-02-20 RX ORDER — KETOROLAC TROMETHAMINE 30 MG/ML
30 INJECTION, SOLUTION INTRAMUSCULAR; INTRAVENOUS ONCE
Status: COMPLETED | OUTPATIENT
Start: 2020-02-20 | End: 2020-02-20

## 2020-02-20 RX ORDER — ONDANSETRON 2 MG/ML
4 INJECTION INTRAMUSCULAR; INTRAVENOUS EVERY 6 HOURS PRN
Status: DISCONTINUED | OUTPATIENT
Start: 2020-02-20 | End: 2020-02-20

## 2020-02-20 RX ORDER — DIPHENHYDRAMINE HCL 25 MG
25 TABLET ORAL EVERY 4 HOURS PRN
Status: DISCONTINUED | OUTPATIENT
Start: 2020-02-20 | End: 2020-02-20

## 2020-02-20 RX ORDER — ACETAMINOPHEN 500 MG
1000 TABLET ORAL EVERY 6 HOURS PRN
Status: DISCONTINUED | OUTPATIENT
Start: 2020-02-20 | End: 2020-02-21 | Stop reason: HOSPADM

## 2020-02-20 RX ORDER — SODIUM CHLORIDE 0.9 % (FLUSH) 0.9 %
10 SYRINGE (ML) INJECTION EVERY 12 HOURS SCHEDULED
Status: DISCONTINUED | OUTPATIENT
Start: 2020-02-20 | End: 2020-02-20

## 2020-02-20 RX ADMIN — SODIUM CHLORIDE, POTASSIUM CHLORIDE, SODIUM LACTATE AND CALCIUM CHLORIDE: 600; 310; 30; 20 INJECTION, SOLUTION INTRAVENOUS at 12:21

## 2020-02-20 RX ADMIN — SODIUM CHLORIDE, POTASSIUM CHLORIDE, SODIUM LACTATE AND CALCIUM CHLORIDE: 600; 310; 30; 20 INJECTION, SOLUTION INTRAVENOUS at 05:20

## 2020-02-20 RX ADMIN — Medication 10 MG: at 19:56

## 2020-02-20 RX ADMIN — SODIUM CHLORIDE, POTASSIUM CHLORIDE, SODIUM LACTATE AND CALCIUM CHLORIDE: 600; 310; 30; 20 INJECTION, SOLUTION INTRAVENOUS at 21:38

## 2020-02-20 RX ADMIN — Medication 2.5 MILLION UNITS: at 14:05

## 2020-02-20 RX ADMIN — NALBUPHINE HYDROCHLORIDE 10 MG: 10 INJECTION, SOLUTION INTRAMUSCULAR; INTRAVENOUS; SUBCUTANEOUS at 19:56

## 2020-02-20 RX ADMIN — Medication 2.5 MILLION UNITS: at 09:30

## 2020-02-20 RX ADMIN — Medication 2.5 MILLION UNITS: at 17:44

## 2020-02-20 RX ADMIN — PENICILLIN G POTASSIUM 5 MILLION UNITS: 5000000 INJECTION, POWDER, FOR SOLUTION INTRAMUSCULAR; INTRAVENOUS at 05:52

## 2020-02-20 RX ADMIN — BETAMETHASONE SODIUM PHOSPHATE AND BETAMETHASONE ACETATE 12 MG: 3; 3 INJECTION, SUSPENSION INTRA-ARTICULAR; INTRALESIONAL; INTRAMUSCULAR at 06:33

## 2020-02-20 RX ADMIN — Medication 1 MILLI-UNITS/MIN: at 07:30

## 2020-02-20 RX ADMIN — KETOROLAC TROMETHAMINE 30 MG: 30 INJECTION, SOLUTION INTRAMUSCULAR at 23:26

## 2020-02-20 RX ADMIN — Medication 2.5 MILLION UNITS: at 22:05

## 2020-02-20 ASSESSMENT — PAIN SCALES - GENERAL
PAINLEVEL_OUTOF10: 7
PAINLEVEL_OUTOF10: 3

## 2020-02-20 NOTE — H&P
OBSTETRICAL HISTORY [facility]    Date: 2020       Time: 5:45 AM   Patient Name: Lucero Reynoso     Patient : 1990  Room/Bed: 4237/4326-14    Admission Date/Time: 2020  4:32 AM      CC: Leakage of fluid and vaginal bleeding     HPI: Lucero Reynoso is a 34 y.o. [id number] at 36w0d who presents for leakage of fluid and vaginal bleeding. She reports that she felt a pop at 0400 and then felt a large gush of fluid. She also noted vaginal bleeding at that time. She reports she continues to feel leaking with moving and standing at this time. She states she had bright red bleeding that soaked through her pants on the way to the hospital. She is feeling minimal cramping. She denies any recent trauma. She denies any drug use. Patient was sleeping when symptoms started. Patient denies any headache, visual changes, difficulty breathing, RUQ pain, N/V, F/C, and pain/swelling in lower extremities. Denies any dysuria or vaginal discharge. . The patient reports fetal movement is present, complains of mild contractions. DATING:  LMP: Patient's last menstrual period was 2019 (exact date).   Estimated Date of Delivery: 3/19/20   Based on: LMP, confirmed by US at 8 5/7 weeks GA    PREGNANCY RISK FACTORS:  Patient Active Problem List   Diagnosis    HSV-2 (herpes simplex virus 2) infection    Late prenatal care affecting pregnancy in second trimester    High-risk pregnancy, second trimester    Smoker    History of postpartum depression    Depression with anxiety    Family history of sickle cell trait in father    33 weeks gestation of pregnancy    Proteinuria (2+)    36 weeks gestation of pregnancy        Steroids Given In This Pregnancy:  no     REVIEW OF SYSTEMS:   Constitutional: negative fever, negative chills, negative weight changes   HEENT: negative visual disturbances, negative headaches, negative dizziness, negative hearing loss  Breast: Negative breast external rectal exam performed with no lesions. Late Prenatal Care (18w)    Hx of Postpartum Depression   - Stable at this time on no meds   - Denies SI/HI    Tobacco Use   - Cessation encouraged    Hx of SAB x2          Patient Active Problem List    Diagnosis Date Noted    36 weeks gestation of pregnancy 2020    Proteinuria (2+) 2020     P/c ordered : 0.22      29 weeks gestation of pregnancy 2020    Late prenatal care affecting pregnancy in second trimester 10/22/2019     GA 18w5d at time of first Ob visit.  High-risk pregnancy, second trimester 10/22/2019     10/22/19- MFM referral placed for anatomy scan. Maternal Quad screen ordered.  Smoker 10/22/2019     4-8 cig per day. Pt attempting to cut back. Pt counseled on maternal/fetal risk factors. Pt verbalizes understanding       History of postpartum depression 10/22/2019     G3      Depression with anxiety 10/22/2019     10/22/19- Pt [facility] to [facility] therapist [facility]. Pt took medication, but did not like how she felt.  Family history of sickle cell trait in father 10/22/2019    HSV-2 (herpes simplex virus 2) infection 2019     Initial Outbreak 2015. Last outbreak 10/8/19. Valtrex at 36 wks          Plan discussed with Dr. Clayton Dillon, who is agreeable. Steroids given this admission: Yes    Risks, benefits, alternatives and possible complications have been discussed in detail with the patient. Admission, and post admission procedures and expectations were discussed in detail. All questions were answered. Attending's Name: Dr. Yuki Obando DO  Ob/Gyn Resident  2020, 5:45 AM     Date: 3/5/2020  Time: 3:22 PM      Patient Name: Maureen Gil  Patient : 1990  Room/Bed: 6407/4332-83  Admission Date/Time: 2020  4:32 AM        Attending Physician Statement  I have discussed the care of Maureen Gil, including pertinent history and exam findings with the resident.  I have reviewed and edited their note in the electronic medical record. The key elements of all parts of the encounter have been performed/reviewed by me . I agree with the assessment, plan and orders as documented by the resident. The level of care submitted represents to the best of my ability the care documented in the medical record today. GC Modifier. This service has been performed in part by a resident under the direction of a teaching physician.         Attending's Name:  Andres Sutton, DO

## 2020-02-20 NOTE — DISCHARGE SUMMARY
Obstetric Discharge Summary  [facility]    Patient Name: Davida Diego  Patient : 1990  Primary Care Physician: PHYSICIAN, NON-STAFF  Admit Date: 2020    Principal Diagnosis: IUP at 36w0d, admitted for PROM (20 @ 0400)     Her pregnancy has been complicated by:   Patient Active Problem List   Diagnosis    HSV-2 (herpes simplex virus 2) infection    Late prenatal care affecting pregnancy in second trimester    High-risk pregnancy, second trimester    Smoker    History of postpartum depression    Depression with anxiety    Family history of sickle cell trait in father    34 weeks gestation of pregnancy    Proteinuria (2+)    celestone , *    History of spontaneous      premature rupture of membranes (PPROM) delivered, current hospitalization      M Apg  Wt ?  Postpartum state       Infection Present?: No  Hospital Acquired: No    Surgical Operations & Procedures:  [] Pitocin Induction of Labor  [x] Pitocin Augmentation of Labor  [] Prostaglandin Induction of Labor  [] Mechanical Induction of Labor  [x] Artificial Rupture of Membranes of Forebag  [] Intrauterine Pressure Catheter  [] Fetal Scalp Electrode  [] Amnioinfusion  Analgesia: none  Delivery Type: Spontaneous Vaginal Delivery: See Labor and Delivery Summary   Laceration(s): Absent    Consultations: none    Pertinent Findings & Procedures:   Davida Diego is a 34 y.o. female [id number] at 36w0d admitted for PROM (20 @ 0400) ; received Pen G, celestone x 1, pitocin, AROM of forebag, Nubain x 1. She delivered by spontaneous vaginal a Live Born infant on 20. Information for the patient's :  Clara White [[id number]]   male  Birth Weight: 5 lb 13.1 oz (2.64 kg)      Apgars: 9 at 1 minute and 9 at 5 minutes.      Postpartum course: normal.      Course of patient: uncomplicated    Discharge to: Home    Readmission planned: no     Recommendations on Discharge:

## 2020-02-21 LAB
CULTURE: NORMAL
CULTURE: NORMAL
Lab: NORMAL
Lab: NORMAL
SPECIMEN DESCRIPTION: NORMAL
SPECIMEN DESCRIPTION: NORMAL

## 2020-02-21 PROCEDURE — 99024 POSTOP FOLLOW-UP VISIT: CPT | Performed by: OBSTETRICS & GYNECOLOGY

## 2020-02-21 PROCEDURE — 6370000000 HC RX 637 (ALT 250 FOR IP): Performed by: STUDENT IN AN ORGANIZED HEALTH CARE EDUCATION/TRAINING PROGRAM

## 2020-02-21 PROCEDURE — 2580000003 HC RX 258: Performed by: STUDENT IN AN ORGANIZED HEALTH CARE EDUCATION/TRAINING PROGRAM

## 2020-02-21 PROCEDURE — 1220000000 HC SEMI PRIVATE OB R&B

## 2020-02-21 RX ORDER — DOCUSATE SODIUM 100 MG/1
100 CAPSULE, LIQUID FILLED ORAL DAILY
Status: DISCONTINUED | OUTPATIENT
Start: 2020-02-21 | End: 2020-02-21 | Stop reason: SDUPTHER

## 2020-02-21 RX ORDER — IBUPROFEN 800 MG/1
800 TABLET ORAL EVERY 8 HOURS PRN
Qty: 30 TABLET | Refills: 0 | Status: SHIPPED | OUTPATIENT
Start: 2020-02-21 | End: 2021-08-06

## 2020-02-21 RX ORDER — ACETAMINOPHEN 325 MG/1
650 TABLET ORAL EVERY 4 HOURS PRN
Status: DISCONTINUED | OUTPATIENT
Start: 2020-02-21 | End: 2020-02-22 | Stop reason: HOSPADM

## 2020-02-21 RX ORDER — SODIUM CHLORIDE 0.9 % (FLUSH) 0.9 %
10 SYRINGE (ML) INJECTION EVERY 12 HOURS SCHEDULED
Status: DISCONTINUED | OUTPATIENT
Start: 2020-02-21 | End: 2020-02-22 | Stop reason: HOSPADM

## 2020-02-21 RX ORDER — SODIUM CHLORIDE 0.9 % (FLUSH) 0.9 %
10 SYRINGE (ML) INJECTION PRN
Status: DISCONTINUED | OUTPATIENT
Start: 2020-02-21 | End: 2020-02-22 | Stop reason: HOSPADM

## 2020-02-21 RX ORDER — ONDANSETRON 4 MG/1
8 TABLET, FILM COATED ORAL EVERY 8 HOURS PRN
Status: DISCONTINUED | OUTPATIENT
Start: 2020-02-21 | End: 2020-02-22 | Stop reason: HOSPADM

## 2020-02-21 RX ORDER — DOCUSATE SODIUM 100 MG/1
100 CAPSULE, LIQUID FILLED ORAL 2 TIMES DAILY
Qty: 60 CAPSULE | Refills: 0 | Status: SHIPPED | OUTPATIENT
Start: 2020-02-21 | End: 2020-03-22

## 2020-02-21 RX ORDER — LANOLIN 100 %
OINTMENT (GRAM) TOPICAL PRN
Status: DISCONTINUED | OUTPATIENT
Start: 2020-02-21 | End: 2020-02-22 | Stop reason: HOSPADM

## 2020-02-21 RX ORDER — DOCUSATE SODIUM 100 MG/1
100 CAPSULE, LIQUID FILLED ORAL 2 TIMES DAILY
Status: DISCONTINUED | OUTPATIENT
Start: 2020-02-21 | End: 2020-02-22 | Stop reason: HOSPADM

## 2020-02-21 RX ADMIN — DOCUSATE SODIUM 100 MG: 100 CAPSULE, LIQUID FILLED ORAL at 08:09

## 2020-02-21 RX ADMIN — Medication 10 ML: at 08:09

## 2020-02-21 RX ADMIN — IBUPROFEN 800 MG: 800 TABLET, FILM COATED ORAL at 06:47

## 2020-02-21 RX ADMIN — IBUPROFEN 800 MG: 800 TABLET, FILM COATED ORAL at 16:19

## 2020-02-21 ASSESSMENT — PAIN SCALES - GENERAL
PAINLEVEL_OUTOF10: 3
PAINLEVEL_OUTOF10: 4

## 2020-02-21 NOTE — PROGRESS NOTES
Labor Progress Note- Late Entry    Mariel Conti is a 34 y.o. female [id number] at 36w0d  The patient was seen and examined. Her pain is well controlled. She reports fetal movement is present, complains of contractions, complains of loss of fluid, denies vaginal bleeding.        Vital Signs:  Vitals:    02/20/20 1836 02/20/20 1930 02/20/20 1933 02/20/20 2029   BP: 122/76  128/63 88/65   Pulse: 62  68 118   Resp: 18   18   Temp:  98.1 °F (36.7 °C)     TempSrc:  Oral     Weight:       Height:             FHT: 125, moderate variability, accelerations present, decelerations absent  Contractions: regular, every 2-3 minutes  Cervical Exam: 2 cm dilated, 50 effaced, -2 station  Pitocin: @ 12 mu/min    Membranes: Forebag present; AROM without difficulty  Scalp Electrode in place: absent  Intrauterine Pressure Catheter in Place: absent    Interventions: AROM of forebag    Assessment/Plan:  Mariel Conti is a 34 y.o. female [id number] at 36w0d PPROM @ 0400 on 2/20   - GBS pending, Pen G for GBS prophylaxis   - Continue pitocin per protocol   - S/P Celestone x 1; Next dose due at 1630 on 2/21   - Attending updated    Humberto Mcfarlane DO  Ob/Gyn Resident  2/20/2020, 8:50 PM

## 2020-02-21 NOTE — PROGRESS NOTES
Labor Progress Note    Gemma Guido is a 34 y.o. female [id number] at 36w0d  The patient was seen and examined. Her pain is somewhat controlled, she does not want anything for pain at this time. She reports fetal movement is present, complains of contractions, complains of loss of fluid, denies vaginal bleeding.        Vital Signs:  Vitals:    02/20/20 1836 02/20/20 1930 02/20/20 1933 02/20/20 2029   BP: 122/76  128/63 88/65   Pulse: 62  68 118   Resp: 18   18   Temp:  98.1 °F (36.7 °C)     TempSrc:  Oral     Weight:       Height:             FHT: 125, moderate variability, accelerations present, no decelerations   Contractions: regular, every 2-3 minutes  Cervical Exam: 5 cm dilated, 80-90% effaced, -1 station  Pitocin: @ 12 mu/min    Membranes: Ruptured clear fluid  Scalp Electrode in place: absent  Intrauterine Pressure Catheter in Place: absent    Interventions: none    Assessment/Plan:  Gemma Guido is a 34 y.o. female A4O0137 at 36w0d PPROM @ 0400 on 2/20   - GBS pending, Pen G for GBS prophylaxis              - Continue pitocin per protocol   - AROM of forebag   - Nubain x 1              - S/P Celestone x 1; Next dose due at 1630 on 2/21    Evgeny Stephens DO  Ob/Gyn Resident  2/20/2020, 9:10 PM

## 2020-02-21 NOTE — CARE COORDINATION
Social Work    Sw was consulted due to a hx depression/anxiety and hx postpartum depression. Sw met with mom in room, mom reports feeling good with no s/s of anxiety or depression. Mom reports a pretty good support system which includes her dad, , and best friend. Mom reports that she lives with her  and other children (15, 8, 6, 7, 6). Mom reports having items for baby including a safe space for baby to sleep (bassinet). Mom reports that she was linked through [employer] throughout pregnancy. Mom reports not having any social questions or concerns. Sw encouraged mom to reach out if any arise.      Intern Margaretann Cogan

## 2020-02-21 NOTE — PROGRESS NOTES
(L) 2020       Assessment/Plan:  1. Arnaldo Mcdonnell is a H7C0367 PPD # 1 s/p  2020   - Doing well, VSS   - male infant in [address], circumcision desired   - Encourage ambulation   - Postpartum Hgb/Hct if symptomatic  2. Rh positive/Rubella immune  3. Breast and Bottle feeding- denies s/s of mastitis  4. Tobacco abuse  - Encouraged cessation  5. Continue post partum care    Counseling Completed:  Secondary Smoke risks and Sudden Infant Death Syndrome were reviewed with recommendations. Infant sleeping, \"back to sleep\" and avoidance of co-sleeping recommendations were reviewed. Signs and Symptoms of Post Partum Depression were reviewed. The patient is to call if any occur. Signs and symptoms of Mastitis were reviewed. The patient is to call if any occur for follow up. Discharge instructions including pelvic rest, no driving with pain medicine and office follow-up were reviewed with patient     Attending Name: Dr. Deandra Conley DO  Ob/Gyn Resident   2020, 1:00 AM     Date: 2020  Time: 10:32 PM      Patient Name: Arnaldo Mcdonnell  Patient : 1990  Room/Bed: 3612/9988-99  Admission Date/Time: 2020  4:32 AM        Attending Physician Statement  I have personally seen, evaluated and discussed the care of Arnaldo Mcdonnell, including pertinent history and exam findings with the resident. I have reviewed and edited their note in the electronic medical record. The key elements of all parts of the encounter have been performed/reviewed by me. I agree with the assessment, plan and orders as documented by the resident. The level of care submitted represents to the best of my ability the care documented in the medical record today. GC Modifier. This service has been performed in part by a resident under the direction of a teaching physician. Attending's Name:  Mirna Mcknight MD        Patient seen on morning rounds, she had no concerns or complaints.  Continue routine post-partum care. Anticipate discharge PPD #2 once meeting discharge criteria.

## 2020-02-21 NOTE — FLOWSHEET NOTE
Pt assisted to restroom. Gait steady. Voids without difficulty. Teresa care instructions given and done per pt. Iv continues. Meets discharge criteria .

## 2020-02-21 NOTE — L&D DELIVERY NOTE
Mother's Information    Labor Events     labor?:  No  Rupture type:  Artificial=AROM  Fluid color:  Clear     Mother Delivery Information    Episiotomy:  None  Lacerations:  None  Repair Suture:  None  Vaginal Delivery Est. Blood Loss (mL):  200  Surgical or Additional Est. Blood Loss (mL):  0 (View Only):  Edit in Flowsheets   Combined Est. Blood Loss (mL):  200        Tootie Carrasco Ellard Morning [[id number]]    Labor Events     labor?:  No   steroids?:  Partial Course  Cervical ripening date/time:     Antibiotics received during labor?:  Yes  Rupture date/time: 20 04:00:00   Rupture type:  Artificial=AROM  Fluid color:  Clear  Fluid odor:  None  Induction:  Oxytocin          Anesthesia    Method:  None     Assisted Delivery Details    Forceps attempted?:  No  Vacuum extractor attempted?:  No                     Document Additional Attempt       Document Additional Attempt                             Shoulder Dystocia    Shoulder dystocia present?:  No                  Add Second Maneuver          Add Third Maneuver          Add Fourth Maneuver          Add Fifth Maneuver          Add Sixth Maneuver          Add Seventh Maneuver          Add Eighth Maneuver          Add Ninth Maneuver              Presentation    Presentation:  [facility]     [place] Information    Head delivery date/time:  2020 22:37:00   Changing the 's delivery date/time could affect patient care.:     Delivery date/time:   20   Delivery type:  Vaginal, Spontaneous    Details:         Delivery Providers    Delivering clinician:  Paty Greer,    Provider Role    Mason Andrews RN Registered Nurse    Jass Price Registered Nurse    Nadia Elliott DO Resident      Cord    Vessels:  3 Vessels  Complications:  None  Delayed cord clamping?:  Yes  Cord clamped date/time:  2020  Cord blood disposition:  Lab  Gases sent?:  Yes     Placenta    Date/time:  2020 22:45:41  Removal:  Spontaneous  Appearance:  Intact  Disposition:  Lab     Delivery Resuscitation    Method:  Bulb Suction, Stimulation     Skin to Skin    Skin to skin initiation date/time: 20 22:38:00   Skin to skin with: Mother  Skin to skin end date/time:        [place] Measurements    Weight:  2640 g           Delivery Information    Episiotomy:  None  Perineal lacerations:  None    Vaginal laceration:  No    Cervical laceration:  No    Vaginal delivery est. blood loss (mL):  200  Surgical or additional est. blood loss (mL):  0 (View Only):  Edit in Flowsheets   Combined est. blood loss (mL):  200  Repair suture:  None     Vaginal Delivery Counts    Initial count personnel:  Griffin Ryan RN  Initial count verified by:  KACEY CARRASCO   4x4:   Needles:   Instruments:   Lap Pads:   Sponges:     Initial counts:          Final counts:          Final count personnel:  DR. Santos Check  Final count verified by:  DR. Santos Check  Accurate final count?:  Yes     Other Procedures    Procedures:  None            Vaginal Delivery Note  Department of Obstetrics and Gynecology  [facility]       Patient: Nay Bruner   : 1990  MRN: [id number]   Date of delivery: 20     Pre-operative Diagnosis: Nay Bruner [id number] at 36w0d,   PROM @ 0400 on   Vaginal bleeding  HSV (SSE neg)  Late PNC (18w)  Hx of PPD  Tobacco Use    Post-operative Diagnosis:  Same as above, delivered viable male; 15% placental abruption noted    Delivering Obstetrician & Assistant(s): Dr. Fern Jo; Naomi Choi DO    Infant Information:   Information for the patient's :  Yanira Blanco [[id number]]      5#13  Information for the patient's :  Yanira Blanco [[id number]]      Apgars:  1 min: 9  5 min: 9      Anesthesia:  none    Application and Delivery:    She was known to be GBS unknown and received antibiotic prophylaxis.      The patient progressed well, became complete and precipitously delivered with RN in

## 2020-02-22 VITALS
HEIGHT: 63 IN | DIASTOLIC BLOOD PRESSURE: 51 MMHG | RESPIRATION RATE: 18 BRPM | SYSTOLIC BLOOD PRESSURE: 97 MMHG | BODY MASS INDEX: 30.12 KG/M2 | HEART RATE: 61 BPM | WEIGHT: 170 LBS | TEMPERATURE: 98.1 F

## 2020-02-22 PROCEDURE — 6370000000 HC RX 637 (ALT 250 FOR IP): Performed by: STUDENT IN AN ORGANIZED HEALTH CARE EDUCATION/TRAINING PROGRAM

## 2020-02-22 RX ADMIN — DOCUSATE SODIUM 100 MG: 100 CAPSULE, LIQUID FILLED ORAL at 09:05

## 2020-02-22 RX ADMIN — IBUPROFEN 800 MG: 800 TABLET, FILM COATED ORAL at 10:27

## 2020-02-22 ASSESSMENT — PAIN SCALES - GENERAL
PAINLEVEL_OUTOF10: 4
PAINLEVEL_OUTOF10: 2

## 2020-02-22 ASSESSMENT — PAIN DESCRIPTION - PAIN TYPE: TYPE: ACUTE PAIN

## 2020-02-22 ASSESSMENT — PAIN DESCRIPTION - LOCATION: LOCATION: ABDOMEN

## 2020-02-22 ASSESSMENT — PAIN DESCRIPTION - DESCRIPTORS: DESCRIPTORS: CRAMPING

## 2020-02-22 ASSESSMENT — PAIN DESCRIPTION - ORIENTATION: ORIENTATION: LOWER;MID

## 2020-02-22 ASSESSMENT — PAIN DESCRIPTION - FREQUENCY: FREQUENCY: INTERMITTENT

## 2020-02-22 ASSESSMENT — PAIN DESCRIPTION - PROGRESSION: CLINICAL_PROGRESSION: RAPIDLY IMPROVING

## 2020-02-22 NOTE — PROGRESS NOTES
POST PARTUM DAY # 2    Gretta Maria is a 34 y.o. female  This patient was seen & examined today. Her pregnancy was complicated by:   Patient Active Problem List   Diagnosis    HSV-2 (herpes simplex virus 2) infection    Late prenatal care affecting pregnancy in second trimester    High-risk pregnancy, second trimester    Smoker    History of postpartum depression    Depression with anxiety    Family history of sickle cell trait in father    34 weeks gestation of pregnancy    Proteinuria (2+)    celestone , *    History of spontaneous      premature rupture of membranes (PPROM) delivered, current hospitalization      M Apg  Wt ?  Postpartum state       Today she is doing well without any chief complaint. Her lochia is light. She denies chest pain, shortness of breath, headache, lightheadedness and blurred vision. She is  breast and bottle feeding and she denies any breast tenderness. She is ambulating well. Her voiding pattern is normal. I reviewed signs and symptoms of post partum depression with the patient, she currently denies any of these symptoms. She is tolerating solids.      Vital Signs:  Vitals:    20 0100 20 0415 20 0800 20   BP: 110/60 110/65 104/60 126/75   Pulse: 56 62 54 55   Resp: 18 16 16 18   Temp: 97.8 °F (36.6 °C) 97.8 °F (36.6 °C) 97.7 °F (36.5 °C) 98.4 °F (36.9 °C)   TempSrc: Oral Oral Oral Oral   Weight:       Height:             Physical Exam:  General:  no apparent distress, alert and cooperative  Neurologic:  alert, oriented, normal speech, no focal findings or movement disorder noted  Lungs:  No increased work of breathing, good air exchange, clear to auscultation bilaterally, no crackles or wheezing  Heart:  regular rate and rhythm    Abdomen: abdomen soft, non-distended, non-tender  Fundus: non-tender, normal size, firm, below umbilicus  Extremities:  no calf tenderness, non edematous    Lab:  Lab Results Component Value Date    HGB 11.8 (L) 2020     Lab Results   Component Value Date    HCT 35.4 (L) 2020       Assessment/Plan:  1. Sandy Rosario is a [id number] PPD # 2 s/p    - Doing well, VSS   - male infant in [address], circumcision done   - Encourage ambulation   - Plan discharge to home today  2. Rh positive/Rubella immune  3. both breast and bottle feeding  4. Tobacco abuse   - encourage cessation   5. Continue post partum care    Counseling Completed:  Secondary Smoke risks and Sudden Infant Death Syndrome were reviewed with recommendations. Infant sleeping, \"back to sleep\" and avoidance of co-sleeping recommendations were reviewed. Signs and Symptoms of Post Partum Depression were reviewed. The patient is to call if any occur. Signs and symptoms of Mastitis were reviewed. The patient is to call if any occur for follow up.   Discharge instructions including pelvic rest, no driving with pain medicine and office follow-up were reviewed with patient     Provider's Name: Dr. Jovan Mitchell DO  Ob/Gyn Resident   2020, 6:24 AM

## 2020-02-22 NOTE — PLAN OF CARE
Problem: Pain:  Goal: Control of acute pain  Description  Control of acute pain  Outcome: Ongoing  Goal: Control of chronic pain  Description  Control of chronic pain  Outcome: Ongoing     Problem: Discharge Planning:  Goal: Discharged to appropriate level of care  Description  Discharged to appropriate level of care  Outcome: Ongoing     Problem: Constipation:  Goal: Bowel elimination is within specified parameters  Description  Bowel elimination is within specified parameters  Outcome: Ongoing     Problem: Mood - Altered:  Goal: Mood stable  Description  Mood stable  Outcome: Ongoing

## 2020-02-22 NOTE — LACTATION NOTE
Met with mom at bedside, baby Timur asleep in crib. Mom is pumping and giving EBM, denies issues, reports her last child\" tore me up with bad latch\". Does plan to put baby to breast some.   Encouraged to call for latch assist.

## 2020-02-22 NOTE — CARE COORDINATION
Discharge Planning: Anticipate DC of couplet 2/22/2020 after 2/20/2020. Male Infant to [facility]    After speaking w/ patient no anticipated need for HC/DME at this time. CM continue to follow for DC needs.

## 2020-02-22 NOTE — PROGRESS NOTES
CLINICAL PHARMACY NOTE: MEDS TO [address] Select Patient?: No  Total # of Prescriptions Filled: 2   The following medications were delivered to the patient:  · dok  · ibuprofen  Total # of Interventions Completed: 0  Time Spent (min): 0    Additional Documentation:

## 2020-02-24 LAB — SURGICAL PATHOLOGY REPORT: NORMAL

## 2020-03-06 ENCOUNTER — POSTPARTUM VISIT (OUTPATIENT)
Dept: OBGYN | Age: 30
End: 2020-03-06
Payer: COMMERCIAL

## 2020-03-06 VITALS
HEIGHT: 63 IN | HEART RATE: 98 BPM | DIASTOLIC BLOOD PRESSURE: 86 MMHG | BODY MASS INDEX: 27.29 KG/M2 | SYSTOLIC BLOOD PRESSURE: 126 MMHG | WEIGHT: 154 LBS

## 2020-03-06 PROCEDURE — 99213 OFFICE O/P EST LOW 20 MIN: CPT | Performed by: STUDENT IN AN ORGANIZED HEALTH CARE EDUCATION/TRAINING PROGRAM

## 2020-03-06 PROCEDURE — G8419 CALC BMI OUT NRM PARAM NOF/U: HCPCS | Performed by: STUDENT IN AN ORGANIZED HEALTH CARE EDUCATION/TRAINING PROGRAM

## 2020-03-06 PROCEDURE — 4004F PT TOBACCO SCREEN RCVD TLK: CPT | Performed by: STUDENT IN AN ORGANIZED HEALTH CARE EDUCATION/TRAINING PROGRAM

## 2020-03-06 PROCEDURE — G8482 FLU IMMUNIZE ORDER/ADMIN: HCPCS | Performed by: STUDENT IN AN ORGANIZED HEALTH CARE EDUCATION/TRAINING PROGRAM

## 2020-03-06 PROCEDURE — 1111F DSCHRG MED/CURRENT MED MERGE: CPT | Performed by: STUDENT IN AN ORGANIZED HEALTH CARE EDUCATION/TRAINING PROGRAM

## 2020-03-06 PROCEDURE — G8427 DOCREV CUR MEDS BY ELIG CLIN: HCPCS | Performed by: STUDENT IN AN ORGANIZED HEALTH CARE EDUCATION/TRAINING PROGRAM

## 2020-03-06 RX ORDER — MEDROXYPROGESTERONE ACETATE 150 MG/ML
150 INJECTION, SUSPENSION INTRAMUSCULAR ONCE
Qty: 1 ML | Refills: 3
Start: 2020-03-06 | End: 2020-03-06 | Stop reason: SDUPTHER

## 2020-03-06 RX ORDER — MEDROXYPROGESTERONE ACETATE 150 MG/ML
150 INJECTION, SUSPENSION INTRAMUSCULAR ONCE
Qty: 1 ML | Refills: 3 | Status: SHIPPED | OUTPATIENT
Start: 2020-03-06 | End: 2021-02-23

## 2020-03-06 NOTE — PROGRESS NOTES
Initial Outbreak 2015. Last outbreak 10/8/19. Valtrex at 36 wks            She does admit to having good home support. OB History    Para Term  AB Living   8 6 5 1 2 6   SAB TAB Ectopic Molar Multiple Live Births   2 0 0 0 0 6      # Outcome Date GA Lbr Stanley/2nd Weight Sex Delivery Anes PTL Lv   8  20 36w0d  5 lb 13.1 oz (2.64 kg) M Vag-Spont None N CEFERINO      Complications: Abruptio Placenta      Name: Devin Andre: 9  Apgar5: 9   7 2017 5w0d          6 Term 13 38w0d  7 lb 11 oz (3.487 kg) M  None  CEFERINO   5 Term 12 38w0d  6 lb 12 oz (3.062 kg) F  None N CEFERINO   4 Term 11 38w0d  6 lb 13 oz (3.09 kg) F  None N CEFERINO   3 Term 09 38w0d  7 lb 1 oz (3.204 kg) F   N CEFERINO   2 2008           1 Term 07 38w0d  6 lb 11 oz (3.033 kg) F  EPI N CEFERINO      Obstetric Comments   G-8   New Partner, pt . G1-7 Same FOB        Patient Active Problem List   Diagnosis    HSV-2 (herpes simplex virus 2) infection    Late prenatal care affecting pregnancy in second trimester    High-risk pregnancy, second trimester    Smoker    History of postpartum depression    Depression with anxiety    Family history of sickle cell trait in father    34 weeks gestation of pregnancy    Proteinuria (2+)    celestone , *    History of spontaneous      premature rupture of membranes (PPROM) delivered, current hospitalization     /20 M Apg 9/9 Wt ?  Postpartum state       Blood pressure 126/86, pulse 98, height 5' 2.99\" (1.6 m), weight 154 lb (69.9 kg), last menstrual period 2019, unknown if currently breastfeeding. Abdomen: Soft and non-tender; ; no guarding, rebound or rigidity; no CVA tenderness bilaterally. Extremities: No calf tenderness bilaterally. No edema. Perineum: declined    Assessment:   Diagnosis Orders   1.  Family planning  medroxyPROGESTERone (DEPO-PROVERA) 150 MG/ML injection     Chief Complaint   Patient presents with    Postpartum Care     2 weeks      Patient Active Problem List    Diagnosis Date Noted    Postpartum state     celestone , * 2020    History of spontaneous  2020     /20 M Apg  Wt ? 2020     premature rupture of membranes (PPROM) delivered, current hospitalization     Proteinuria (2+) 2020     P/c ordered : 0.22      29 weeks gestation of pregnancy 2020    Late prenatal care affecting pregnancy in second trimester 10/22/2019     GA 18w5d at time of first Ob visit.  High-risk pregnancy, second trimester 10/22/2019     10/22/19- MFM referral placed for anatomy scan. Maternal Quad screen ordered.  Smoker 10/22/2019     4-8 cig per day. Pt attempting to cut back. Pt counseled on maternal/fetal risk factors. Pt verbalizes understanding       History of postpartum depression 10/22/2019     G3      Depression with anxiety 10/22/2019     10/22/19- Pt used to see therapist at [facility]. Pt took medication, but did not like how she felt.  Family history of sickle cell trait in father 10/22/2019    HSV-2 (herpes simplex virus 2) infection 2019     Initial Outbreak 2015. Last outbreak 10/8/19. Valtrex at 36 wks            EPDS Score of 3        Plan:  1. Return to the office in  3-4 weeks  2. Signs & Symptoms of mastitis reviewed; notify if occurs  3. Secondary smoke risks reviewed. Increased risks of respiratory problems, Sudden     infant death syndrome, and potential malignancies. 4. Abstinence  5. Family planning counseling and STD counseling completed  6. Return in about 3 weeks (around 3/27/2020) for 6 week postpartum visit.    Depo provera ordered for family planning  She is aware she needs to continue pelvic rest

## 2020-03-27 ENCOUNTER — POSTPARTUM VISIT (OUTPATIENT)
Dept: OBGYN | Age: 30
End: 2020-03-27
Payer: COMMERCIAL

## 2020-03-27 VITALS
HEART RATE: 69 BPM | SYSTOLIC BLOOD PRESSURE: 132 MMHG | DIASTOLIC BLOOD PRESSURE: 88 MMHG | HEIGHT: 63 IN | BODY MASS INDEX: 27.11 KG/M2 | WEIGHT: 153 LBS | TEMPERATURE: 96.3 F

## 2020-03-27 PROCEDURE — 96372 THER/PROPH/DIAG INJ SC/IM: CPT | Performed by: STUDENT IN AN ORGANIZED HEALTH CARE EDUCATION/TRAINING PROGRAM

## 2020-03-27 RX ORDER — MEDROXYPROGESTERONE ACETATE 150 MG/ML
150 INJECTION, SUSPENSION INTRAMUSCULAR ONCE
Status: COMPLETED | OUTPATIENT
Start: 2020-03-27 | End: 2020-03-27

## 2020-03-27 RX ADMIN — MEDROXYPROGESTERONE ACETATE 150 MG: 150 INJECTION, SUSPENSION INTRAMUSCULAR at 10:38

## 2020-03-27 NOTE — PROGRESS NOTES
Lucero Reynoso  3/27/2020  9:37 AM        Lucero Reynoso is a 34 y.o. female [id number]      The patient was seen. She has no chief complaints today. She delivered vaginally on 20. She is  breast pumping and bottle feeding and there is not any signs or symptoms of mastitis. The patient completed the E.P.D.S. Evaluation form and scored 5. She does not have any signs or symptoms of post partum depression. She denies any suicidal thoughts with a plan, intent to harm others and delusional ideas. Today her lochia is light she denies any dizziness or shortness of breath. Her pregnancy was complicated by:  Patient Active Problem List    Diagnosis Date Noted    Postpartum state     celestone , * 2020    History of spontaneous  2020      M Apg  Wt ? 2020     premature rupture of membranes (PPROM) delivered, current hospitalization     Proteinuria (2+) 2020     Overview Note:     P/c ordered : 0.22      29 weeks gestation of pregnancy 2020    Late prenatal care affecting pregnancy in second trimester 10/22/2019     Overview Note:     GA 18w5d at time of first Ob visit.  High-risk pregnancy, second trimester 10/22/2019     Overview Note:     10/22/19- MFM referral placed for anatomy scan. Maternal Quad screen ordered.  Smoker 10/22/2019     Overview Note:     4-8 cig per day. Pt attempting to cut back. Pt counseled on maternal/fetal risk factors. Pt verbalizes understanding       History of postpartum depression 10/22/2019     Overview Note:     G3      Depression with anxiety 10/22/2019     Overview Note:     10/22/19- Pt used to see therapist at [facility]. Pt took medication, but did not like how she felt.  Family history of sickle cell trait in father 10/22/2019    HSV-2 (herpes simplex virus 2) infection 2019     Overview Note:     Initial Outbreak 2015. Last outbreak 10/8/19.   Valtrex at 36 wks

## 2020-06-17 ENCOUNTER — NURSE ONLY (OUTPATIENT)
Dept: OBGYN | Age: 30
End: 2020-06-17
Payer: COMMERCIAL

## 2020-06-17 VITALS
TEMPERATURE: 98.1 F | HEART RATE: 73 BPM | SYSTOLIC BLOOD PRESSURE: 114 MMHG | DIASTOLIC BLOOD PRESSURE: 81 MMHG | HEIGHT: 63 IN | WEIGHT: 160 LBS | BODY MASS INDEX: 28.35 KG/M2

## 2020-06-17 PROCEDURE — 99211 OFF/OP EST MAY X REQ PHY/QHP: CPT | Performed by: OBSTETRICS & GYNECOLOGY

## 2020-06-17 PROCEDURE — 96372 THER/PROPH/DIAG INJ SC/IM: CPT | Performed by: OBSTETRICS & GYNECOLOGY

## 2020-06-17 RX ORDER — MEDROXYPROGESTERONE ACETATE 150 MG/ML
150 INJECTION, SUSPENSION INTRAMUSCULAR ONCE
Status: COMPLETED | OUTPATIENT
Start: 2020-06-17 | End: 2020-06-17

## 2020-06-17 RX ADMIN — MEDROXYPROGESTERONE ACETATE 150 MG: 150 INJECTION, SUSPENSION INTRAMUSCULAR at 11:28

## 2020-07-04 ENCOUNTER — HOSPITAL ENCOUNTER (EMERGENCY)
Age: 30
Discharge: HOME | End: 2020-07-04
Attending: EMERGENCY MEDICINE
Payer: COMMERCIAL

## 2020-07-04 VITALS
HEART RATE: 86 BPM | DIASTOLIC BLOOD PRESSURE: 90 MMHG | OXYGEN SATURATION: 98 % | RESPIRATION RATE: 15 BRPM | SYSTOLIC BLOOD PRESSURE: 135 MMHG | TEMPERATURE: 97.5 F

## 2020-07-04 PROCEDURE — 99282 EMERGENCY DEPT VISIT SF MDM: CPT

## 2020-07-04 ASSESSMENT — PAIN DESCRIPTION - PAIN TYPE: TYPE: ACUTE PAIN

## 2020-07-04 ASSESSMENT — PAIN DESCRIPTION - LOCATION: LOCATION: NECK

## 2020-07-04 ASSESSMENT — PAIN SCALES - GENERAL: PAINLEVEL_OUTOF10: 7

## 2020-07-04 NOTE — ED PROVIDER NOTES
[facility] [facility]     Emergency Department     Faculty Attestation        I performed a history and physical examination of the patient and discussed management with the resident. I reviewed the residents note and agree with the documented findings and plan of care. Any areas of disagreement are noted on the chart. I was personally present for the key portions of any procedures. I have documented in the chart those procedures where I was not present during the key portions. I have reviewed the emergency nurses triage note. I agree with the chief complaint, past medical history, past surgical history, allergies, medications, social and family history as documented unless otherwise noted below. For mid-level providers such as nurse practitioners as well as physicians assistants:    I have personally seen and evaluated the patient. I find the patient's history and physical exam are consistent with NP/PA documentation. I agree with the care provided, treatment rendered, disposition, & follow-up plan. Additional findings are as noted. Vital Signs: BP (!) 135/90   Pulse 86   Temp 97.5 °F (36.4 °C) (Oral)   Resp 15   SpO2 98%   PCP:  PHYSICIAN, NON-STAFF    Pertinent Comments:     No bony tenderness patient walking around in no acute distress holding her child.       Critical Care  None          Marilynn Paige MD  Attending Emergency Medicine Physician              Lou Rincon MD  07/04/20 [id number]
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
## 2020-08-21 ENCOUNTER — HOSPITAL ENCOUNTER (EMERGENCY)
Age: 30
Discharge: HOME | End: 2020-08-21
Attending: EMERGENCY MEDICINE
Payer: COMMERCIAL

## 2020-08-21 VITALS
HEART RATE: 79 BPM | RESPIRATION RATE: 18 BRPM | WEIGHT: 161 LBS | SYSTOLIC BLOOD PRESSURE: 116 MMHG | DIASTOLIC BLOOD PRESSURE: 81 MMHG | HEIGHT: 63 IN | OXYGEN SATURATION: 98 % | BODY MASS INDEX: 28.53 KG/M2 | TEMPERATURE: 97.9 F

## 2020-08-21 PROCEDURE — 99282 EMERGENCY DEPT VISIT SF MDM: CPT

## 2020-08-21 NOTE — ED PROVIDER NOTES
[facility]     Emergency Department     Faculty Note/ Attestation      Pt Name: Omar Hawkins                                       MRN: [id number]  [place] 1990  Date of evaluation: 8/21/2020    Patients PCP:    PHYSICIAN, NON-STAFF      Attestation  I performed a history and physical examination of the patient and discussed management with the resident. I reviewed the residents note and agree with the documented findings and plan of care. Any areas of disagreement are noted on the chart. I was personally present for the key portions of any procedures. I have documented in the chart those procedures where I was not present during the key portions. I have reviewed the emergency nurses triage note. I agree with the chief complaint, past medical history, past surgical history, allergies, medications, social and family history as documented unless otherwise noted below. For Physician Assistant/ Nurse Practitioner cases/documentation I have personally evaluated this patient and have completed at least one if not all key elements of the E/M (history, physical exam, and MDM). Additional findings are as noted.       Initial Screens:             Vitals:    Vitals:    08/21/20 0804   BP: 116/81   Pulse: 79   Resp: 18   Temp: 97.9 °F (36.6 °C)   TempSrc: Oral   SpO2: 98%   Weight: 161 lb (73 kg)   Height: 5' 3\" (1.6 m)       [address] [place] [address]       Chief Complaint   Patient presents with    Other     Pt stapled her finger and needs it removed             DIAGNOSTIC RESULTS             RADIOLOGY:   No orders to display         LABS:  Labs Reviewed - No data to display      EMERGENCY DEPARTMENT COURSE:     -------------------------  BP: 116/81, Temp: 97.9 °F (36.6 °C), Pulse: 79, Resp: 18      Comments    Accidental staple to thumb  Removed  Td UTD  D/c with wound care    (Please note that portions of this note were completed with a voice recognition program.  Efforts were made to edit the dictations but occasionally words are mis-transcribed.)      Garcia MD  Attending Emergency Physician         Андрей Pena MD  08/21/20 1844

## 2020-08-21 NOTE — ED PROVIDER NOTES
[facility] ED  Emergency Department Encounter  Emergency Medicine Resident     Pt Name: Altagracia Lechuga  MRN: [id number]  [place] 1990  Date of evaluation: 8/21/20  PCP:  PHYSICIAN, NON-STAFF    CHIEF COMPLAINT       Chief Complaint   Patient presents with    Other     Pt stapled her finger and needs it removed       HISTORY OFPRESENT ILLNESS  (Location/Symptom, Timing/Onset, Context/Setting, Quality, Duration, Modifying Factors,Severity.)      Altagracia Lechuga is a 27 y.o. female who presents with concern after accidentally stapling her right thumb when attempting to fix a pillow with a stapler at home. Patient has the staple embedded underneath her right nailbed. Patient has sensation intact, is able to range the thumb, she was just afraid to take the staple out at home. Patient has no medical problems, most recent tetanus shot was in January 2020. PAST MEDICAL / SURGICAL / SOCIAL / FAMILY HISTORY      has a past medical history of Depression with anxiety and HSV-2 (herpes simplex virus 2) infection. has no past surgical history on file.      Social History     Socioeconomic History    Marital status: Single     Spouse name: Not on file    Number of children: Not on file    Years of education: Not on file    Highest education level: Not on file   Occupational History    Not on file   Social Needs    Financial resource strain: Not on file    Food insecurity     Worry: Not on file     Inability: Not on file    Transportation needs     Medical: Not on file     Non-medical: Not on file   Tobacco Use    Smoking status: Current Every Day Smoker     Packs/day: 0.25     Types: Cigarettes    Smokeless tobacco: Never Used    Tobacco comment: \"3cigs/day\"  11/12/2019   Substance and Sexual Activity    Alcohol use: Not Currently     Comment: social when not preg     Drug use: No    Sexual activity: Yes     Partners: Male     Comment: pt     Lifestyle    Physical activity     Days vomiting  Genito-Urinary ROS - No dysuria, Nohematuria  Musculoskeletal ROS - No back pain, No neck pain  Dermatological ROS - No wound, No rash  PHYSICAL EXAM   (up to 7 for level 4, 8 or more forlevel 5)      INITIAL VITALS:   ED Triage Vitals [08/21/20 0804]   BP Temp Temp Source Pulse Resp SpO2 Height Weight   116/81 97.9 °F (36.6 °C) Oral 79 18 98 % 5' 3\" (1.6 m) 161 lb (73 kg)       Physical Exam  Constitutional:       Appearance: She is well-developed. HENT:      Head: Normocephalic and atraumatic. Eyes:      Comments: Staple in right thumb angulated under nail bad, regular staple from stapler and not from a staple gun    Neck:      Musculoskeletal: Normal range of motion and neck supple. Cardiovascular:      Rate and Rhythm: Normal rate and regular rhythm. Pulmonary:      Effort: Pulmonary effort is normal. No respiratory distress. Breath sounds: Normal breath sounds. No stridor. Musculoskeletal: Normal range of motion. General: No deformity. Skin:     General: Skin is warm and dry. Capillary Refill: Capillary refill takes less than 2 seconds. Neurological:      Mental Status: She is alert and oriented to person, place, and time. Psychiatric:         Behavior: Behavior normal.         DIFFERENTIAL  DIAGNOSIS     PLAN (LABS / IMAGING / EKG):  No orders of the defined types were placed in this encounter. MEDICATIONS ORDERED:  No orders of the defined types were placed in this encounter. DDX: foreign body, nail bed injury     Initial MDM/Plan: 27 y.o. female who presents with concern for accidentally stapling her right thumb this morning while trying to fix a pillow. Patient was using a regular stapler. Patient was too afraid to remove the foreign body, plan to remove the foreign body with a pair tweezers here in the emergency department. Low suspicion for nailbed injury will give patient return precautions and encouraged Tylenol Motrin for pain therapy.   Will encourage patient to use soapy marti and Neosporin for antibacterial protection. DIAGNOSTIC RESULTS / EMERGENCYDEPARTMENT COURSE / MDM     LABS:  Labs Reviewed - No data to display      RADIOLOGY:  No results found. EKG  NA     All EKG's are interpreted by the Emergency Department Physicianwho either signs or Co-signs this chart in the absence of a cardiologist.    EMERGENCY DEPARTMENT COURSE:       Staple was removed with tweezers, 1 use the pool, no complications, patient tolerated procedure well. PROCEDURES:  None    CONSULTS:  None    CRITICAL CARE:  Please see attending note    FINAL IMPRESSION      1.  H/O retained foreign body fully removed          DISPOSITION / PLAN     DISPOSITION Decision To Discharge 08/21/2020 08:17:02 AM      PATIENT REFERRED TO:  [facility]  [address] [facility] [address]  [phone]  Call   As needed      DISCHARGE MEDICATIONS:  New Prescriptions    No medications on file       Nellie Palacios DO  Emergency Medicine Resident    (Please note that portions of this note were completed with a voice recognition program.Efforts were made to edit the dictations but occasionally words are mis-transcribed.)        Nellie Palacios DO  Resident  08/21/20 2473

## 2020-08-21 NOTE — ED NOTES
Pt to ED following stapling her right thumb this morning while stapling a pillow. Staple is still in pt thumb, pt wishes physician to reove it. Pt unsure when last tetanus shot was. Pt is alert and oritned x4 with no signs of acute distress.       Galen Schmitz RN  08/21/20 0219

## 2020-09-17 ENCOUNTER — NURSE ONLY (OUTPATIENT)
Dept: OBGYN | Age: 30
End: 2020-09-17
Payer: COMMERCIAL

## 2020-09-17 VITALS
SYSTOLIC BLOOD PRESSURE: 124 MMHG | HEART RATE: 73 BPM | WEIGHT: 167 LBS | BODY MASS INDEX: 29.58 KG/M2 | DIASTOLIC BLOOD PRESSURE: 76 MMHG

## 2020-09-17 PROCEDURE — 99211 OFF/OP EST MAY X REQ PHY/QHP: CPT | Performed by: OBSTETRICS & GYNECOLOGY

## 2020-09-17 PROCEDURE — 96372 THER/PROPH/DIAG INJ SC/IM: CPT | Performed by: OBSTETRICS & GYNECOLOGY

## 2020-09-17 RX ORDER — MEDROXYPROGESTERONE ACETATE 150 MG/ML
150 INJECTION, SUSPENSION INTRAMUSCULAR ONCE
Status: COMPLETED | OUTPATIENT
Start: 2020-09-17 | End: 2020-09-17

## 2020-09-17 RX ADMIN — MEDROXYPROGESTERONE ACETATE 150 MG: 150 INJECTION, SUSPENSION INTRAMUSCULAR at 10:10

## 2020-12-08 ENCOUNTER — OFFICE VISIT (OUTPATIENT)
Dept: PRIMARY CARE CLINIC | Age: 30
End: 2020-12-08
Payer: COMMERCIAL

## 2020-12-08 ENCOUNTER — HOSPITAL ENCOUNTER (OUTPATIENT)
Age: 30
Setting detail: SPECIMEN
Discharge: HOME | End: 2020-12-08
Payer: COMMERCIAL

## 2020-12-08 VITALS
DIASTOLIC BLOOD PRESSURE: 68 MMHG | SYSTOLIC BLOOD PRESSURE: 121 MMHG | HEART RATE: 76 BPM | TEMPERATURE: 97.9 F | OXYGEN SATURATION: 98 %

## 2020-12-08 PROCEDURE — 99202 OFFICE O/P NEW SF 15 MIN: CPT | Performed by: INTERNAL MEDICINE

## 2020-12-08 PROCEDURE — G8484 FLU IMMUNIZE NO ADMIN: HCPCS | Performed by: INTERNAL MEDICINE

## 2020-12-08 PROCEDURE — 4004F PT TOBACCO SCREEN RCVD TLK: CPT | Performed by: INTERNAL MEDICINE

## 2020-12-08 PROCEDURE — G8419 CALC BMI OUT NRM PARAM NOF/U: HCPCS | Performed by: INTERNAL MEDICINE

## 2020-12-08 PROCEDURE — G8427 DOCREV CUR MEDS BY ELIG CLIN: HCPCS | Performed by: INTERNAL MEDICINE

## 2020-12-08 ASSESSMENT — ENCOUNTER SYMPTOMS
COUGH: 1
FLU SYMPTOMS: 1

## 2020-12-08 NOTE — PROGRESS NOTES
[address] [facility] [address]  Dept: [phone]  Dept Fax: [phone]    Jareth Trotter is a 27 y.o. female who presents to the urgent care today for her medicalconditions/complaints as noted below. Jareth Trotter is c/o of Cough (dry onset 3 days); Chills (onset 3 days); and Dizziness (onset 3 days)      HPI:     Influenza   This is a new problem. The current episode started in the past 7 days. The problem occurs constantly. The problem has been unchanged. Associated symptoms include chills, coughing and vertigo. Nothing aggravates the symptoms. She has tried nothing for the symptoms. The treatment provided no relief. Past Medical History:   Diagnosis Date    Depression with anxiety 10/22/2019    HSV-2 (herpes simplex virus 2) infection 11/2015    Initial outbreak. last  outbreak 10/8/19         Current Outpatient Medications   Medication Sig Dispense Refill    medroxyPROGESTERone (DEPO-PROVERA) 150 MG/ML injection Inject 1 mL into the muscle once for 1 dose 1 mL 3    ibuprofen (ADVIL;MOTRIN) 800 MG tablet Take 1 tablet by mouth every 8 hours as needed for Pain (Patient not taking: Reported on 12/8/2020) 30 tablet 0    Prenatal Vit-Fe Fumarate-FA (PRENATAL VITAMIN PO) Take 1 tablet by mouth daily Indications: Pt recieved at first look       No current facility-administered medications for this visit.       Allergies   Allergen Reactions    Clindamycin/Lincomycin Hives       Health Maintenance   Topic Date Due    Varicella vaccine (1 of 2 - 2-dose childhood series) 06/22/1991    Pneumococcal 0-64 years Vaccine (1 of 1 - PPSV23) 06/22/1996    Flu vaccine (1) 09/01/2020    Cervical cancer screen  10/17/2021    DTaP/Tdap/Td vaccine (4 - Td) 01/15/2030    Hib vaccine  Completed    HIV screen  Completed    Hepatitis A vaccine  Aged Out    Hepatitis B vaccine  Aged Out    Meningococcal (ACWY) vaccine  Aged Out       Subjective:      Review of Systems   Constitutional: Positive for chills. Respiratory: Positive for cough. Neurological: Positive for vertigo. All other systems reviewed and are negative. Objective:     Physical Exam  Vitals signs reviewed. Constitutional:       Appearance: Normal appearance. HENT:      Head: Normocephalic and atraumatic. Skin:     General: Skin is warm and dry. Neurological:      General: No focal deficit present. Mental Status: She is alert and oriented to person, place, and time. Psychiatric:         Mood and Affect: Mood normal.         Behavior: Behavior normal.       /68 (Site: Left Upper Arm, Position: Sitting, Cuff Size: Medium Adult)   Pulse 76   Temp 97.9 °F (36.6 °C) (Oral)   SpO2 98%       Assessment:       Diagnosis Orders   1. Flu-like symptoms  COVID-19 Ambulatory       Plan:      No follow-ups on file. No orders of the defined types were placed in this encounter. Orders Placed This Encounter   Procedures    COVID-19 Ambulatory     Standing Status:   Future     Standing Expiration Date:   12/8/2021     Scheduling Instructions:      Saline media preferred given current shortage of viral transport media but both acceptable     Order Specific Question:   Is this test for diagnosis or screening? Answer:   Diagnosis of ill patient     Order Specific Question:   Symptomatic for COVID-19 as defined by CDC? Answer:   Yes     Order Specific Question:   Date of Symptom Onset     Answer:   12/6/2020     Order Specific Question:   Hospitalized for COVID-19? Answer:   No     Order Specific Question:   Admitted to ICU for COVID-19? Answer:   No     Order Specific Question:   Employed in healthcare setting? Answer:   No     Order Specific Question:   Resident in a congregate (group) care setting? Answer:   No     Order Specific Question:   Pregnant?      Answer:   No    Droplet Plus Isolation - (Use only for COVID-19)     Standing Status:   Standing     Number of Occurrences:   1            Patient given educational materials - see patient instructions. Discussed use, benefit, and side effects of prescribed medications. All patientquestions answered. Pt voiced understanding.     Electronically signed by Keesha Menchaca MD on 12/8/2020at 11:52 AM

## 2020-12-08 NOTE — LETTER
[address] [address]  Phone: [phone]  Fax: [phone]    Jie Moran MD        December 8, 2020     Patient: Rick Webber   YOB: 1990   Date of Visit: 12/8/2020       To Whom it May Concern:    Rick Webber was seen in my clinic on 12/8/2020. She Is to be excused from work/school until COVID-19 results are finalized within the next 5 - 10 days. .    If you have any questions or concerns, please don't hesitate to call.     Sincerely,         Jie Moran MD

## 2020-12-10 ENCOUNTER — NURSE ONLY (OUTPATIENT)
Dept: OBGYN | Age: 30
End: 2020-12-10
Payer: COMMERCIAL

## 2020-12-10 PROCEDURE — 96372 THER/PROPH/DIAG INJ SC/IM: CPT | Performed by: OBSTETRICS & GYNECOLOGY

## 2020-12-10 RX ORDER — MEDROXYPROGESTERONE ACETATE 150 MG/ML
150 INJECTION, SUSPENSION INTRAMUSCULAR ONCE
Status: COMPLETED | OUTPATIENT
Start: 2020-12-10 | End: 2020-12-10

## 2020-12-10 RX ADMIN — MEDROXYPROGESTERONE ACETATE 150 MG: 150 INJECTION, SUSPENSION INTRAMUSCULAR at 09:17

## 2020-12-11 LAB — SARS-COV-2, NAA: NOT DETECTED

## 2021-02-23 DIAGNOSIS — Z30.09 FAMILY PLANNING: ICD-10-CM

## 2021-02-23 RX ORDER — MEDROXYPROGESTERONE ACETATE 150 MG/ML
INJECTION, SUSPENSION INTRAMUSCULAR
Qty: 1 ML | Refills: 3 | Status: SHIPPED | OUTPATIENT
Start: 2021-02-23

## 2021-03-04 ENCOUNTER — NURSE ONLY (OUTPATIENT)
Dept: OBGYN | Age: 31
End: 2021-03-04
Payer: COMMERCIAL

## 2021-03-04 DIAGNOSIS — Z30.09 FAMILY PLANNING: Primary | ICD-10-CM

## 2021-03-04 PROCEDURE — 96372 THER/PROPH/DIAG INJ SC/IM: CPT | Performed by: OBSTETRICS & GYNECOLOGY

## 2021-03-04 RX ORDER — MEDROXYPROGESTERONE ACETATE 150 MG/ML
150 INJECTION, SUSPENSION INTRAMUSCULAR ONCE
Status: COMPLETED | OUTPATIENT
Start: 2021-03-04 | End: 2021-03-04

## 2021-03-04 RX ADMIN — MEDROXYPROGESTERONE ACETATE 150 MG: 150 INJECTION, SUSPENSION INTRAMUSCULAR at 09:45

## 2021-03-04 NOTE — PROGRESS NOTES
After obtaining consent, and per orders of Dr. Christiano Irby, injection of Medroxyprogesterone 150 mg given in Right dorsogluteal by Rekha Mcnally. Patient instructed to remain in clinic for 20 minutes afterwards, and to report any adverse reaction to me immediately.

## 2021-03-09 ENCOUNTER — TELEPHONE (OUTPATIENT)
Dept: OBGYN | Age: 31
End: 2021-03-09

## 2021-03-09 PROBLEM — O09.92 HIGH-RISK PREGNANCY, SECOND TRIMESTER: Status: RESOLVED | Noted: 2019-10-22 | Resolved: 2021-03-09

## 2021-03-09 PROBLEM — O09.32 LATE PRENATAL CARE AFFECTING PREGNANCY IN SECOND TRIMESTER: Status: RESOLVED | Noted: 2019-10-22 | Resolved: 2021-03-09

## 2021-03-09 PROBLEM — Z3A.36 36 WEEKS GESTATION OF PREGNANCY: Status: RESOLVED | Noted: 2020-02-20 | Resolved: 2021-03-09

## 2021-03-09 PROBLEM — Z3A.29 29 WEEKS GESTATION OF PREGNANCY: Status: RESOLVED | Noted: 2020-01-08 | Resolved: 2021-03-09

## 2021-03-09 NOTE — TELEPHONE ENCOUNTER
Patient no showed for Annual appointment at [address] office. Writer left message for patient to call the office to reschedule appointment. no

## 2021-06-02 ENCOUNTER — NURSE ONLY (OUTPATIENT)
Dept: OBGYN | Age: 31
End: 2021-06-02
Payer: COMMERCIAL

## 2021-06-02 DIAGNOSIS — Z30.09 FAMILY PLANNING: Primary | ICD-10-CM

## 2021-06-02 PROCEDURE — 96372 THER/PROPH/DIAG INJ SC/IM: CPT

## 2021-06-02 PROCEDURE — 99211 OFF/OP EST MAY X REQ PHY/QHP: CPT

## 2021-06-02 RX ORDER — MEDROXYPROGESTERONE ACETATE 150 MG/ML
150 INJECTION, SUSPENSION INTRAMUSCULAR ONCE
Status: COMPLETED | OUTPATIENT
Start: 2021-06-02 | End: 2021-06-02

## 2021-06-02 RX ADMIN — MEDROXYPROGESTERONE ACETATE 150 MG: 150 INJECTION, SUSPENSION, EXTENDED RELEASE INTRAMUSCULAR at 11:45

## 2021-07-31 ENCOUNTER — HOSPITAL ENCOUNTER (EMERGENCY)
Age: 31
Discharge: LEFT BEFORE DISCHARGE | End: 2021-07-31
Payer: COMMERCIAL

## 2021-07-31 VITALS
SYSTOLIC BLOOD PRESSURE: 150 MMHG | OXYGEN SATURATION: 100 % | RESPIRATION RATE: 18 BRPM | DIASTOLIC BLOOD PRESSURE: 97 MMHG | TEMPERATURE: 97.3 F | BODY MASS INDEX: 28.35 KG/M2 | HEIGHT: 63 IN | WEIGHT: 160 LBS | HEART RATE: 74 BPM

## 2021-08-01 ENCOUNTER — HOSPITAL ENCOUNTER (EMERGENCY)
Age: 31
Discharge: HOME | End: 2021-08-01
Attending: EMERGENCY MEDICINE
Payer: COMMERCIAL

## 2021-08-01 VITALS
OXYGEN SATURATION: 100 % | TEMPERATURE: 98.8 F | WEIGHT: 160 LBS | HEIGHT: 63 IN | SYSTOLIC BLOOD PRESSURE: 134 MMHG | RESPIRATION RATE: 16 BRPM | BODY MASS INDEX: 28.35 KG/M2 | DIASTOLIC BLOOD PRESSURE: 94 MMHG | HEART RATE: 79 BPM

## 2021-08-01 DIAGNOSIS — K08.89 PAIN, DENTAL: Primary | ICD-10-CM

## 2021-08-01 PROCEDURE — 99283 EMERGENCY DEPT VISIT LOW MDM: CPT

## 2021-08-01 PROCEDURE — 6370000000 HC RX 637 (ALT 250 FOR IP): Performed by: STUDENT IN AN ORGANIZED HEALTH CARE EDUCATION/TRAINING PROGRAM

## 2021-08-01 PROCEDURE — 6360000002 HC RX W HCPCS: Performed by: STUDENT IN AN ORGANIZED HEALTH CARE EDUCATION/TRAINING PROGRAM

## 2021-08-01 PROCEDURE — 96372 THER/PROPH/DIAG INJ SC/IM: CPT

## 2021-08-01 RX ORDER — PENICILLIN V POTASSIUM 250 MG/1
500 TABLET ORAL ONCE
Status: COMPLETED | OUTPATIENT
Start: 2021-08-01 | End: 2021-08-01

## 2021-08-01 RX ORDER — PENICILLIN V POTASSIUM 500 MG/1
500 TABLET ORAL 4 TIMES DAILY
Qty: 28 TABLET | Refills: 0 | Status: SHIPPED | OUTPATIENT
Start: 2021-08-01 | End: 2021-08-08

## 2021-08-01 RX ORDER — KETOROLAC TROMETHAMINE 30 MG/ML
30 INJECTION, SOLUTION INTRAMUSCULAR; INTRAVENOUS ONCE
Status: COMPLETED | OUTPATIENT
Start: 2021-08-01 | End: 2021-08-01

## 2021-08-01 RX ADMIN — PENICILLIN V POTASIUM 500 MG: 250 TABLET ORAL at 07:06

## 2021-08-01 RX ADMIN — KETOROLAC TROMETHAMINE 30 MG: 30 INJECTION, SOLUTION INTRAMUSCULAR; INTRAVENOUS at 07:06

## 2021-08-01 ASSESSMENT — PAIN SCALES - GENERAL: PAINLEVEL_OUTOF10: 8

## 2021-08-01 ASSESSMENT — ENCOUNTER SYMPTOMS
FACIAL SWELLING: 1
NAUSEA: 0
COLOR CHANGE: 0
SHORTNESS OF BREATH: 0
DIARRHEA: 0
COUGH: 0
WHEEZING: 0
ABDOMINAL PAIN: 0
VOMITING: 0
BACK PAIN: 0

## 2021-08-01 NOTE — ED PROVIDER NOTES
Other Topics Concern    Not on file   Social History Narrative    Not on file     Social Determinants of Health     Financial Resource Strain:     Difficulty of Paying Living Expenses:    Food Insecurity:     Worried About Running Out of Food in the Last Year:     [address] in the Last Year:    Transportation Needs:     Lack of Transportation (Medical):  Lack of Transportation (Non-Medical):    Physical Activity:     Days of Exercise per Week:     Minutes of Exercise per Session:    Stress:     Feeling of Stress :    Social Connections:     Frequency of Communication with Friends and Family:     Frequency of Social Gatherings with Friends and Family:     Attends [employer] Services:     Active Member of Clubs or Organizations:     Attends Club or Organization Meetings:     Marital Status:    Intimate Partner Violence:     Fear of Current or Ex-Partner:     Emotionally Abused:     Physically Abused:     Sexually Abused:        Family History   Problem Relation Age of Onset    Cancer Paternal Grandfather     Cancer Paternal Grandmother     Cancer Maternal Grandfather     Hypertension Father     Diabetes Mother         type 2    No Known Problems Brother     No Known Problems Sister        Allergies:  Clindamycin/lincomycin    Home Medications:  Prior to Admission medications    Medication Sig Start Date End Date Taking?  Authorizing Provider   penicillin v potassium (VEETID) 500 MG tablet Take 1 tablet by mouth 4 times daily for 7 days 8/1/21 8/8/21 Yes Pino Bernal DO   medroxyPROGESTERone (DEPO-PROVERA) 150 MG/ML injection inject 1 milliliter INTO THE MUSCLE ONCE FOR 1 DOSE. 2/23/21   Felicitsa Leija MD   ibuprofen (ADVIL;MOTRIN) 800 MG tablet Take 1 tablet by mouth every 8 hours as needed for Pain  Patient not taking: Reported on 12/8/2020 2/21/20   Oumou Conley DO   Prenatal Vit-Fe Fumarate-FA (PRENATAL VITAMIN PO) Take 1 tablet by mouth daily Indications: Pt recieved at first look    Historical Provider, MD       REVIEW OF SYSTEMS    (2-9 systems for level 4, 10 or more for level 5)      Review of Systems   Constitutional: Negative for chills, fatigue and fever. HENT: Positive for dental problem and facial swelling (Left lower jaw). Negative for congestion. Respiratory: Negative for cough, shortness of breath and wheezing. Cardiovascular: Negative for chest pain and palpitations. Gastrointestinal: Negative for abdominal pain, diarrhea, nausea and vomiting. Endocrine: Negative for polydipsia. Musculoskeletal: Negative for arthralgias, back pain, neck pain and neck stiffness. Skin: Negative for color change, pallor and rash. Neurological: Negative for weakness, light-headedness and headaches. PHYSICAL EXAM   (up to 7 for level 4, 8 or more for level 5)      INITIAL VITALS:   BP (!) 134/94   Pulse 79   Temp 98.8 °F (37.1 °C) (Oral)   Resp 16   Ht 5' 3\" (1.6 m)   Wt 160 lb (72.6 kg)   SpO2 100%   BMI 28.34 kg/m²     Physical Exam  Vitals and nursing note reviewed. Constitutional:       General: She is not in acute distress. Appearance: She is well-developed. She is not diaphoretic. HENT:      Head: Normocephalic and atraumatic. Mouth/Throat:      Mouth: Mucous membranes are moist.      Pharynx: Oropharynx is clear. Comments: Diffuse dental caries, pain in the region of tooth 18, no periodontal abscess  Eyes:      General: No scleral icterus. Conjunctiva/sclera: Conjunctivae normal.      Pupils: Pupils are equal, round, and reactive to light. Neck:      Vascular: No JVD. Trachea: No tracheal deviation. Cardiovascular:      Rate and Rhythm: Normal rate and regular rhythm. Heart sounds: Normal heart sounds. No murmur heard. No friction rub. Pulmonary:      Effort: Pulmonary effort is normal. No respiratory distress. Breath sounds: Normal breath sounds. No wheezing.    Chest:      Chest wall: No tenderness. Abdominal:      General: Bowel sounds are normal. There is no distension. Palpations: Abdomen is soft. Tenderness: There is no abdominal tenderness. There is no guarding. Musculoskeletal:      Cervical back: Normal range of motion and neck supple. Skin:     General: Skin is warm and dry. Capillary Refill: Capillary refill takes less than 2 seconds. Coloration: Skin is not pale. Findings: No erythema. Neurological:      Mental Status: She is alert and oriented to person, place, and time. Cranial Nerves: No cranial nerve deficit. Sensory: No sensory deficit. Psychiatric:         Mood and Affect: Mood normal.         Behavior: Behavior normal.         DIFFERENTIAL  DIAGNOSIS     PLAN (LABS / IMAGING / EKG):  No orders of the defined types were placed in this encounter. MEDICATIONS ORDERED:  Orders Placed This Encounter   Medications    penicillin v potassium (VEETID) tablet 500 mg    ketorolac (TORADOL) injection 30 mg    penicillin v potassium (VEETID) 500 MG tablet     Sig: Take 1 tablet by mouth 4 times daily for 7 days     Dispense:  28 tablet     Refill:  0       DDX: Dental caries, peritonsillar abscess, dental pain    MDM/IMPRESSION: This is a 24-year-old female presenting with left lower jaw pain secondary to dental pain. Feeling for last several months ago, starting pain Friday. Patient tolerating p.o., taken Tylenol for her symptoms. Will order intramuscular Toradol, penicillin, outpatient follow-up with her dentist which she is appointment for this week. Patient agreeable, comfortable going home. DIAGNOSTIC RESULTS / EMERGENCY DEPARTMENT COURSE / MDM   LAB RESULTS:  No results found for this visit on 08/01/21.       RADIOLOGY:  No orders to display        EKG      All EKG's are interpreted by the Emergency Department Physician who either signs or Co-signs this chart in the absence of a cardiologist.    EMERGENCY DEPARTMENT COURSE: Patient seen and evaluated. Given intramuscular Toradol and penicillin. Tolerated well, patient has an appointment with her dentist this week. PROCEDURES:      CONSULTS:  None    CRITICAL CARE:      FINAL IMPRESSION      1. Pain, dental          DISPOSITION / PLAN     DISPOSITION        PATIENT REFERRED TO:  No follow-up provider specified.     DISCHARGE MEDICATIONS:  New Prescriptions    PENICILLIN V POTASSIUM (VEETID) 500 MG TABLET    Take 1 tablet by mouth 4 times daily for 7 days       Roberto Hoffman DO  Emergency Medicine Resident    (Please note that portions of thisnote were completed with a voice recognition program.  Efforts were made to edit the dictations but occasionally words are mis-transcribed.)     Roberto Hoffman DO  Resident  08/01/21 0749

## 2021-08-01 NOTE — ED PROVIDER NOTES
[facility] [facility]     Emergency Department     Faculty Attestation    I performed a history and physical examination of the patient and discussed management with the resident. I reviewed the residents note and agree with the documented findings and plan of care. Any areas of disagreement are noted on the chart. I was personally present for the key portions of any procedures. I have documented in the chart those procedures where I was not present during the key portions. I have reviewed the emergency nurses triage note. I agree with the chief complaint, past medical history, past surgical history, allergies, medications, social and family history as documented unless otherwise noted below. For Physician Assistant/ Nurse Practitioner cases/documentation I have personally evaluated this patient and have completed at least one if not all key elements of the E/M (history, physical exam, and MDM). Additional findings are as noted. This patient was evaluated in the Emergency Department for symptoms described in the history of present illness. He/she was evaluated in the context of the global COVID-19 pandemic, which necessitated consideration that the patient might be at risk for infection with the SARS-CoV-2 virus that causes COVID-19. Institutional protocols and algorithms that pertain to the evaluation of patients at risk for COVID-19 are in a state of rapid change based on information released by regulatory bodies including the CDC and federal and state organizations. These policies and algorithms were followed during the patient's care in the ED. Patient complains of tooth pain. Afebrile. On exam there is no abscess to palpation, Master's, trismus, or cervical nodes. Will discharge.         Critical Care     none    Vicki Montgomery MD, Randy Cody  Attending Emergency  Physician             Vicki Montgomery MD  08/01/21 2611

## 2021-08-01 NOTE — ED TRIAGE NOTES
Pt presents to ED from home c/o 8/10 jaw pain from her lower back left side of her mouth. Pt has a tooth that lost a filling back in June 2021, and says the pain began 2 days ago. Pt has an appointment with the dentist this week to get it filled, and states she is here \"for an infection\" and \"wants antibiotics\". Pt is A&Ox4, resp even and unlabored, NAD. Pt has white exudate looking substance on her L side of her cheek near the tooth, but no sign of abscess.

## 2021-08-06 ENCOUNTER — OFFICE VISIT (OUTPATIENT)
Dept: PRIMARY CARE CLINIC | Age: 31
End: 2021-08-06
Payer: COMMERCIAL

## 2021-08-06 VITALS
BODY MASS INDEX: 31 KG/M2 | TEMPERATURE: 97.4 F | WEIGHT: 175 LBS | SYSTOLIC BLOOD PRESSURE: 124 MMHG | OXYGEN SATURATION: 95 % | HEART RATE: 77 BPM | DIASTOLIC BLOOD PRESSURE: 85 MMHG

## 2021-08-06 DIAGNOSIS — M25.562 ACUTE PAIN OF LEFT KNEE: ICD-10-CM

## 2021-08-06 DIAGNOSIS — Z76.89 ENCOUNTER TO ESTABLISH CARE: Primary | ICD-10-CM

## 2021-08-06 DIAGNOSIS — M25.532 LEFT WRIST PAIN: ICD-10-CM

## 2021-08-06 PROCEDURE — G8417 CALC BMI ABV UP PARAM F/U: HCPCS | Performed by: NURSE PRACTITIONER

## 2021-08-06 PROCEDURE — 4004F PT TOBACCO SCREEN RCVD TLK: CPT | Performed by: NURSE PRACTITIONER

## 2021-08-06 PROCEDURE — G8428 CUR MEDS NOT DOCUMENT: HCPCS | Performed by: NURSE PRACTITIONER

## 2021-08-06 PROCEDURE — 99203 OFFICE O/P NEW LOW 30 MIN: CPT | Performed by: NURSE PRACTITIONER

## 2021-08-06 SDOH — ECONOMIC STABILITY: FOOD INSECURITY: WITHIN THE PAST 12 MONTHS, YOU WORRIED THAT YOUR FOOD WOULD RUN OUT BEFORE YOU GOT MONEY TO BUY MORE.: NEVER TRUE

## 2021-08-06 SDOH — ECONOMIC STABILITY: FOOD INSECURITY: WITHIN THE PAST 12 MONTHS, THE FOOD YOU BOUGHT JUST DIDN'T LAST AND YOU DIDN'T HAVE MONEY TO GET MORE.: NEVER TRUE

## 2021-08-06 ASSESSMENT — ENCOUNTER SYMPTOMS
COUGH: 0
SHORTNESS OF BREATH: 0

## 2021-08-06 ASSESSMENT — SOCIAL DETERMINANTS OF HEALTH (SDOH): HOW HARD IS IT FOR YOU TO PAY FOR THE VERY BASICS LIKE FOOD, HOUSING, MEDICAL CARE, AND HEATING?: NOT HARD AT ALL
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
## 2021-08-25 ENCOUNTER — NURSE ONLY (OUTPATIENT)
Dept: OBGYN | Age: 31
End: 2021-08-25
Payer: COMMERCIAL

## 2021-08-25 DIAGNOSIS — Z30.09 FAMILY PLANNING: Primary | ICD-10-CM

## 2021-08-25 RX ORDER — MEDROXYPROGESTERONE ACETATE 150 MG/ML
150 INJECTION, SUSPENSION INTRAMUSCULAR ONCE
Status: COMPLETED | OUTPATIENT
Start: 2021-08-25 | End: 2021-08-25

## 2021-08-25 RX ADMIN — MEDROXYPROGESTERONE ACETATE 150 MG: 150 INJECTION, SUSPENSION INTRAMUSCULAR at 11:10

## 2021-08-25 NOTE — PROGRESS NOTES
After obtaining consent, and per orders of Dr. Stanley Ortiz, injection of Medroxyprogesterone 150 mg given in Right upper quad. gluteus by Kaibeto Standing. Patient instructed to remain in clinic for 20 minutes afterwards, and to report any adverse reaction to me immediately.

## 2021-08-30 ENCOUNTER — HOSPITAL ENCOUNTER (OUTPATIENT)
Age: 31
Setting detail: SPECIMEN
Discharge: HOME | End: 2021-08-30
Payer: COMMERCIAL

## 2021-08-30 ENCOUNTER — OFFICE VISIT (OUTPATIENT)
Dept: PRIMARY CARE CLINIC | Age: 31
End: 2021-08-30
Payer: COMMERCIAL

## 2021-08-30 VITALS — DIASTOLIC BLOOD PRESSURE: 81 MMHG | SYSTOLIC BLOOD PRESSURE: 118 MMHG | OXYGEN SATURATION: 98 % | HEART RATE: 73 BPM

## 2021-08-30 DIAGNOSIS — R30.0 DYSURIA: ICD-10-CM

## 2021-08-30 DIAGNOSIS — N76.0 ACUTE VAGINITIS: Primary | ICD-10-CM

## 2021-08-30 DIAGNOSIS — R39.9 UTI SYMPTOMS: ICD-10-CM

## 2021-08-30 DIAGNOSIS — N76.0 ACUTE VAGINITIS: ICD-10-CM

## 2021-08-30 LAB
-: ABNORMAL
AMORPHOUS: ABNORMAL
BACTERIA: ABNORMAL
BILIRUBIN URINE: NEGATIVE
BILIRUBIN, POC: ABNORMAL
BLOOD URINE, POC: ABNORMAL
CASTS UA: ABNORMAL /LPF (ref 0–8)
CLARITY, POC: CLEAR
COLOR, POC: YELLOW
COLOR: YELLOW
COMMENT UA: ABNORMAL
CRYSTALS, UA: ABNORMAL /HPF
EPITHELIAL CELLS UA: ABNORMAL /HPF (ref 0–5)
GLUCOSE URINE, POC: ABNORMAL
GLUCOSE URINE: NEGATIVE
KETONES, POC: ABNORMAL
KETONES, URINE: ABNORMAL
LEUKOCYTE EST, POC: ABNORMAL
LEUKOCYTE ESTERASE, URINE: NEGATIVE
MUCUS: ABNORMAL
NITRITE, POC: ABNORMAL
NITRITE, URINE: NEGATIVE
OTHER OBSERVATIONS UA: ABNORMAL
PH UA: 6 (ref 5–8)
PH, POC: 6
PROTEIN UA: ABNORMAL
PROTEIN, POC: ABNORMAL
RBC UA: ABNORMAL /HPF (ref 0–4)
RENAL EPITHELIAL, UA: ABNORMAL /HPF
SPECIFIC GRAVITY UA: 1.03 (ref 1–1.03)
SPECIFIC GRAVITY, POC: 1.02
TRICHOMONAS: ABNORMAL
TURBIDITY: CLEAR
URINE HGB: NEGATIVE
UROBILINOGEN, POC: ABNORMAL
UROBILINOGEN, URINE: NORMAL
WBC UA: ABNORMAL /HPF (ref 0–5)
YEAST: ABNORMAL

## 2021-08-30 PROCEDURE — 81003 URINALYSIS AUTO W/O SCOPE: CPT | Performed by: INTERNAL MEDICINE

## 2021-08-30 PROCEDURE — 4004F PT TOBACCO SCREEN RCVD TLK: CPT | Performed by: INTERNAL MEDICINE

## 2021-08-30 PROCEDURE — G8417 CALC BMI ABV UP PARAM F/U: HCPCS | Performed by: INTERNAL MEDICINE

## 2021-08-30 PROCEDURE — G8427 DOCREV CUR MEDS BY ELIG CLIN: HCPCS | Performed by: INTERNAL MEDICINE

## 2021-08-30 PROCEDURE — 99213 OFFICE O/P EST LOW 20 MIN: CPT | Performed by: INTERNAL MEDICINE

## 2021-08-30 RX ORDER — FLUCONAZOLE 150 MG/1
150 TABLET ORAL ONCE
Qty: 1 TABLET | Refills: 1 | Status: SHIPPED | OUTPATIENT
Start: 2021-08-30 | End: 2021-08-30

## 2021-08-30 NOTE — PROGRESS NOTES
[facility]  [facility]  [address] [address]  Dept: [phone]  Dept Fax: [phone]    Lina Liao is a 32 y.o. female who presents to the urgent care today for her medicalconditions/complaints as noted below. Lina Liao is c/o of Urinary Tract Infection (2 weeks with discharge)      HPI:     Urinary Tract Infection   This is a new problem. The current episode started 1 to 4 weeks ago (10 days). The problem occurs every urination (sharp pain when urinates). The problem has been unchanged. Associated symptoms include a discharge (white and creamy no itching), frequency and urgency. She has tried increased fluids for the symptoms. The treatment provided no relief. Urinalysis shows only 1+ protein. Past Medical History:   Diagnosis Date    Depression with anxiety 10/22/2019    History of  premature rupture of membranes ()     HSV-2 (herpes simplex virus 2) infection 2015    Initial outbreak. last  outbreak 10/8/19         Current Outpatient Medications   Medication Sig Dispense Refill    fluconazole (DIFLUCAN) 150 MG tablet Take 1 tablet by mouth once for 1 dose 1 tablet 1    medroxyPROGESTERone (DEPO-PROVERA) 150 MG/ML injection inject 1 milliliter INTO THE MUSCLE ONCE FOR 1 DOSE. 1 mL 3    Elastic Bandages & Supports (WRIST SPLINT) MISC Apply 1 each topically continuous Cock up wrist splint 1 each 0     No current facility-administered medications for this visit.      Allergies   Allergen Reactions    Clindamycin/Lincomycin Hives       Health Maintenance   Topic Date Due    Hepatitis C screen  Never done    Varicella vaccine (1 of 2 - 2-dose childhood series) Never done    Pneumococcal 0-64 years Vaccine (1 of 2 - PPSV23) Never done    COVID-19 Vaccine (1) Never done    Flu vaccine (1) 2021    Cervical cancer screen  10/17/2021    DTaP/Tdap/Td vaccine (4 - Td or Tdap) 01/15/2030    Hib vaccine  Completed    HIV screen Completed    Hepatitis A vaccine  Aged Out    Hepatitis B vaccine  Aged Out    Meningococcal (ACWY) vaccine  Aged Out       Subjective:      Review of Systems   Genitourinary: Positive for frequency and urgency. All other systems reviewed and are negative. Objective:     Physical Exam  Vitals reviewed. Constitutional:       Appearance: Normal appearance. HENT:      Head: Normocephalic and atraumatic. Skin:     General: Skin is warm and dry. Neurological:      General: No focal deficit present. Mental Status: She is alert and oriented to person, place, and time. Psychiatric:         Mood and Affect: Mood normal.         Behavior: Behavior normal.       /81   Pulse 73   SpO2 98%       Assessment:       Diagnosis Orders   1. Acute vaginitis  Vaginitis DNA Probe    fluconazole (DIFLUCAN) 150 MG tablet   2. UTI symptoms  POCT Urinalysis No Micro (Auto)   3. Dysuria  Urinalysis Reflex to Culture       Plan:      No follow-ups on file. Orders Placed This Encounter   Medications    fluconazole (DIFLUCAN) 150 MG tablet     Sig: Take 1 tablet by mouth once for 1 dose     Dispense:  1 tablet     Refill:  1     Orders Placed This Encounter   Procedures    Vaginitis DNA Probe     Standing Status:   Future     Standing Expiration Date:   8/30/2022    Urinalysis Reflex to Culture     Standing Status:   Future     Standing Expiration Date:   8/30/2022     Order Specific Question:   SPECIFY(EX-CATH,MIDSTREAM,CYSTO,ETC)? Answer:   ms garcia    POCT Urinalysis No Micro (Auto)            Patient given educational materials - see patient instructions. Discussed use, benefit, and side effects of prescribed medications. All patientquestions answered. Pt voiced understanding.     Electronically signed by Hilda Correia MD on 8/30/2021at 1:04 PM

## 2021-08-30 NOTE — PROGRESS NOTES
Visit Information    Have you changed or started any medications since your last visit including any over-the-counter medicines, vitamins, or herbal medicines? no   Are you having any side effects from any of your medications? -  no  Have you stopped taking any of your medications? Is so, why? -  no    Have you seen any other physician or provider since your last visit? No  Have you had any other diagnostic tests since your last visit? No  Have you been seen in the emergency room and/or had an admission to a hospital since we last saw you? No  Have you had your routine dental cleaning in the past 6 months? no    Have you activated your [employer] account? If not, what are your barriers?  Yes     Patient Care Team:  RADHA Contreras CNP as PCP - General (Family Nurse Practitioner)  RADHA Contreras CNP as PCP - [facility]  Lily Ni MD as Obstetrician (Perinatology)    Medical History Review  Past Medical, Family, and Social History reviewed and does not contribute to the patient presenting condition    Health Maintenance   Topic Date Due    Hepatitis C screen  Never done    Varicella vaccine (1 of 2 - 2-dose childhood series) Never done    Pneumococcal 0-64 years Vaccine (1 of 2 - PPSV23) Never done    COVID-19 Vaccine (1) Never done    Flu vaccine (1) 09/01/2021    Cervical cancer screen  10/17/2021    DTaP/Tdap/Td vaccine (4 - Td or Tdap) 01/15/2030    Hib vaccine  Completed    HIV screen  Completed    Hepatitis A vaccine  Aged Out    Hepatitis B vaccine  Aged Out    Meningococcal (ACWY) vaccine  Aged Out

## 2021-08-31 DIAGNOSIS — B96.89 BV (BACTERIAL VAGINOSIS): Primary | ICD-10-CM

## 2021-08-31 DIAGNOSIS — N76.0 BV (BACTERIAL VAGINOSIS): Primary | ICD-10-CM

## 2021-08-31 LAB
DIRECT EXAM: ABNORMAL
Lab: ABNORMAL
SPECIMEN DESCRIPTION: ABNORMAL

## 2021-08-31 RX ORDER — METRONIDAZOLE 500 MG/1
500 TABLET ORAL 2 TIMES DAILY
Qty: 20 TABLET | Refills: 0 | Status: SHIPPED | OUTPATIENT
Start: 2021-08-31 | End: 2021-09-10

## 2021-09-16 ENCOUNTER — OFFICE VISIT (OUTPATIENT)
Dept: PRIMARY CARE CLINIC | Age: 31
End: 2021-09-16
Payer: COMMERCIAL

## 2021-09-16 VITALS — DIASTOLIC BLOOD PRESSURE: 97 MMHG | TEMPERATURE: 98 F | HEART RATE: 80 BPM | SYSTOLIC BLOOD PRESSURE: 149 MMHG

## 2021-09-16 DIAGNOSIS — K04.7 DENTAL INFECTION: Primary | ICD-10-CM

## 2021-09-16 PROCEDURE — G8427 DOCREV CUR MEDS BY ELIG CLIN: HCPCS | Performed by: NURSE PRACTITIONER

## 2021-09-16 PROCEDURE — 99213 OFFICE O/P EST LOW 20 MIN: CPT | Performed by: NURSE PRACTITIONER

## 2021-09-16 PROCEDURE — 4004F PT TOBACCO SCREEN RCVD TLK: CPT | Performed by: NURSE PRACTITIONER

## 2021-09-16 PROCEDURE — G8417 CALC BMI ABV UP PARAM F/U: HCPCS | Performed by: NURSE PRACTITIONER

## 2021-09-16 RX ORDER — CEFTRIAXONE 1 G/1
1000 INJECTION, POWDER, FOR SOLUTION INTRAMUSCULAR; INTRAVENOUS ONCE
Status: COMPLETED | OUTPATIENT
Start: 2021-09-16 | End: 2021-09-16

## 2021-09-16 RX ORDER — AMOXICILLIN 500 MG/1
500 CAPSULE ORAL 3 TIMES DAILY
Qty: 30 CAPSULE | Refills: 0 | Status: SHIPPED | OUTPATIENT
Start: 2021-09-16 | End: 2021-09-26

## 2021-09-16 RX ADMIN — CEFTRIAXONE 1000 MG: 1 INJECTION, POWDER, FOR SOLUTION INTRAMUSCULAR; INTRAVENOUS at 12:40

## 2021-09-16 ASSESSMENT — ENCOUNTER SYMPTOMS
CHOKING: 0
CHEST TIGHTNESS: 0
SHORTNESS OF BREATH: 0
SINUS PRESSURE: 0
COUGH: 0

## 2021-09-16 NOTE — PROGRESS NOTES
[facility]  [facility] IN CARE  [address] [address]  Dept: [phone]  Dept Fax: [phone]    Makayla Hodgson is a 32 y.o. female who presents to the urgent care today for her medical conditions/complaints as notedbelow. Makayla Hodgson is c/o of Dental Problem (abcess for 2 days )      HPI:     32 yr old female presents for recurring dental infection. Needs bottom 2 back teeth pulled, following with [facility], appt to have teeth pulled . Was on course [employer] last month, sx improved. Went to dentist office today but could not get her in. No fevers, drng or chest pain. Face feels a little swollen. Dental Pain   This is a recurrent problem. The current episode started in the past 7 days. The problem occurs constantly. The problem has been gradually worsening. The pain is moderate. Associated symptoms include facial pain. Pertinent negatives include no difficulty swallowing, fever, oral bleeding, sinus pressure or thermal sensitivity. She has tried NSAIDs and ice for the symptoms. The treatment provided no relief. Past Medical History:   Diagnosis Date    Depression with anxiety 10/22/2019    History of  premature rupture of membranes ()     HSV-2 (herpes simplex virus 2) infection 2015    Initial outbreak.   last  outbreak 10/8/19         Current Outpatient Medications   Medication Sig Dispense Refill    amoxicillin (AMOXIL) 500 MG capsule Take 1 capsule by mouth 3 times daily for 10 days 30 capsule 0    Elastic Bandages & Supports (WRIST SPLINT) MISC Apply 1 each topically continuous Cock up wrist splint 1 each 0    medroxyPROGESTERone (DEPO-PROVERA) 150 MG/ML injection inject 1 milliliter INTO THE MUSCLE ONCE FOR 1 DOSE. 1 mL 3     Current Facility-Administered Medications   Medication Dose Route Frequency Provider Last Rate Last Admin    cefTRIAXone (ROCEPHIN) injection 1,000 mg  1,000 mg IntraMUSCular Once Peter Hinojosa, APRN - [facility] Allergies   Allergen Reactions    Clindamycin/Lincomycin Hives       Subjective:      Review of Systems   Constitutional: Negative for fever. HENT: Negative for sinus pressure. Respiratory: Negative for cough, choking, chest tightness and shortness of breath. Cardiovascular: Negative for chest pain. All other systems reviewed and are negative. 14 systems reviewed and negative except as listed in HPI. Objective:     Physical Exam  Vitals and nursing note reviewed. Constitutional:       General: She is not in acute distress. Appearance: Normal appearance. She is not ill-appearing, toxic-appearing or diaphoretic. HENT:      Head: Normocephalic and atraumatic. Right Ear: External ear normal.      Left Ear: External ear normal.      Mouth/Throat:      Lips: Pink. No lesions. Mouth: Mucous membranes are moist. No injury or oral lesions. Dentition: Abnormal dentition. Does not have dentures. Gingival swelling and dental caries present. No dental tenderness, dental abscesses or gum lesions. Tongue: No lesions. Tongue does not deviate from midline. Palate: No mass and lesions. Pharynx: Oropharynx is clear. Uvula midline. No pharyngeal swelling, oropharyngeal exudate, posterior oropharyngeal erythema or uvula swelling. Comments: Left lower cheek slightly swollen, no erythema or redness, + tenderness with palpation  Teeth #18 and 19 with cavities, discolored, gum swollen, no abscess visualized, no drng  Cardiovascular:      Rate and Rhythm: Normal rate and regular rhythm. Pulses: Normal pulses. Heart sounds: Normal heart sounds. Pulmonary:      Effort: Pulmonary effort is normal.      Breath sounds: Normal breath sounds. Musculoskeletal:      Cervical back: Normal range of motion and neck supple. Comments: Gait steady   Lymphadenopathy:      Cervical: No cervical adenopathy. Skin:     General: Skin is warm and dry.    Neurological: General: No focal deficit present. Mental Status: She is alert. Psychiatric:         Mood and Affect: Mood normal.       BP (!) 149/97   Pulse 80   Temp 98 °F (36.7 °C) (Temporal)     Assessment:       Diagnosis Orders   1. Dental infection         Plan:    recurring dental infection  appt with [address] for extraction molars x2  Requests amoxil rx  Rocephin IM given  Cont tylenol/aleve  Return for follow up with your Dentist.    Orders Placed This Encounter   Medications    cefTRIAXone (ROCEPHIN) injection 1,000 mg     Order Specific Question:   Antimicrobial Indications     Answer:   Skin and Soft Tissue Infection     Order Specific Question:   Suspected Organism(s)     Answer:   dental infection    amoxicillin (AMOXIL) 500 MG capsule     Sig: Take 1 capsule by mouth 3 times daily for 10 days     Dispense:  30 capsule     Refill:  0         Patient given educational materials - see patient instructions. Discussed use, benefit, and side effects of prescribed medications. All patient questions answered. Pt voicedunderstanding.     Electronically signed by RADHA Sotelo CNP on 9/16/2021 at 11:05 AM

## 2021-11-17 ENCOUNTER — NURSE ONLY (OUTPATIENT)
Dept: OBGYN | Age: 31
End: 2021-11-17
Payer: COMMERCIAL

## 2021-11-17 VITALS — WEIGHT: 175 LBS | BODY MASS INDEX: 29.88 KG/M2 | HEIGHT: 64 IN

## 2021-11-17 DIAGNOSIS — Z30.09 FAMILY PLANNING: Primary | ICD-10-CM

## 2021-11-17 PROCEDURE — 90471 IMMUNIZATION ADMIN: CPT | Performed by: OBSTETRICS & GYNECOLOGY

## 2021-11-17 PROCEDURE — 96372 THER/PROPH/DIAG INJ SC/IM: CPT | Performed by: OBSTETRICS & GYNECOLOGY

## 2021-11-17 RX ORDER — MEDROXYPROGESTERONE ACETATE 150 MG/ML
150 INJECTION, SUSPENSION INTRAMUSCULAR ONCE
Status: COMPLETED | OUTPATIENT
Start: 2021-11-17 | End: 2021-11-17

## 2021-11-17 RX ADMIN — MEDROXYPROGESTERONE ACETATE 150 MG: 150 INJECTION, SUSPENSION INTRAMUSCULAR at 09:50

## 2021-11-17 NOTE — PROGRESS NOTES
After obtaining consent, and per orders of Dr. Gallo Farley, injection of Depo given in Right upper quad. gluteus by Mar Ascencio MA. Patient instructed to remain in clinic for 20 minutes afterwards, and to report any adverse reaction to me immediately.

## 2022-02-03 RX ORDER — MEDROXYPROGESTERONE ACETATE 150 MG/ML
INJECTION, SUSPENSION INTRAMUSCULAR
Qty: 1 ML | Refills: 0 | Status: SHIPPED | OUTPATIENT
Start: 2022-02-03

## 2022-02-09 ENCOUNTER — TELEPHONE (OUTPATIENT)
Dept: OBGYN | Age: 32
End: 2022-02-09

## 2022-02-09 NOTE — TELEPHONE ENCOUNTER
Patient no showed for 2/9/22 appointment at [address] [address] office. Writer left a voice message for patient to call the office to reschedule appointment.

## 2022-03-28 ENCOUNTER — OFFICE VISIT (OUTPATIENT)
Dept: OBGYN | Age: 32
End: 2022-03-28
Payer: COMMERCIAL

## 2022-03-28 VITALS
DIASTOLIC BLOOD PRESSURE: 85 MMHG | SYSTOLIC BLOOD PRESSURE: 136 MMHG | HEART RATE: 104 BPM | BODY MASS INDEX: 30.55 KG/M2 | WEIGHT: 178 LBS

## 2022-03-28 DIAGNOSIS — Z30.013 INITIATION OF DEPO PROVERA: ICD-10-CM

## 2022-03-28 DIAGNOSIS — N94.6 DYSMENORRHEA: Primary | ICD-10-CM

## 2022-03-28 LAB
CONTROL: PRESENT
PREGNANCY TEST URINE, POC: NEGATIVE

## 2022-03-28 PROCEDURE — G8484 FLU IMMUNIZE NO ADMIN: HCPCS | Performed by: STUDENT IN AN ORGANIZED HEALTH CARE EDUCATION/TRAINING PROGRAM

## 2022-03-28 PROCEDURE — 81025 URINE PREGNANCY TEST: CPT | Performed by: STUDENT IN AN ORGANIZED HEALTH CARE EDUCATION/TRAINING PROGRAM

## 2022-03-28 PROCEDURE — G8417 CALC BMI ABV UP PARAM F/U: HCPCS | Performed by: STUDENT IN AN ORGANIZED HEALTH CARE EDUCATION/TRAINING PROGRAM

## 2022-03-28 PROCEDURE — 4004F PT TOBACCO SCREEN RCVD TLK: CPT | Performed by: STUDENT IN AN ORGANIZED HEALTH CARE EDUCATION/TRAINING PROGRAM

## 2022-03-28 PROCEDURE — 99212 OFFICE O/P EST SF 10 MIN: CPT | Performed by: STUDENT IN AN ORGANIZED HEALTH CARE EDUCATION/TRAINING PROGRAM

## 2022-03-28 PROCEDURE — G8427 DOCREV CUR MEDS BY ELIG CLIN: HCPCS | Performed by: STUDENT IN AN ORGANIZED HEALTH CARE EDUCATION/TRAINING PROGRAM

## 2022-03-28 RX ORDER — MEDROXYPROGESTERONE ACETATE 150 MG/ML
150 INJECTION, SUSPENSION INTRAMUSCULAR ONCE
Status: COMPLETED | OUTPATIENT
Start: 2022-03-28 | End: 2022-03-28

## 2022-03-28 RX ORDER — MEDROXYPROGESTERONE ACETATE 150 MG/ML
150 INJECTION, SUSPENSION INTRAMUSCULAR
Qty: 1 ML | Refills: 3 | Status: SHIPPED | OUTPATIENT
Start: 2022-03-28

## 2022-03-28 RX ORDER — MEDROXYPROGESTERONE ACETATE 150 MG/ML
150 INJECTION, SUSPENSION INTRAMUSCULAR ONCE
Status: CANCELLED | OUTPATIENT
Start: 2022-03-28 | End: 2022-03-28

## 2022-03-28 RX ADMIN — MEDROXYPROGESTERONE ACETATE 150 MG: 150 INJECTION, SUSPENSION, EXTENDED RELEASE INTRAMUSCULAR at 14:11

## 2022-03-28 NOTE — PROGRESS NOTES
OB/GYN Problem Visit    Jj Clinton  3/28/2022                       Primary Care Physician: RADHA Card - CNP    CC:   Chief Complaint   Patient presents with    Follow-up     restart depo . upt negative         HPI: Jj Clinton is a 32 y.o. female [id number]    The patient was seen and examined. She is here to restart Depo-Provera injection    Her No LMP recorded (lmp unknown). Patient has had an injection. Last injection 21. Patient has not had a period since but states that they used to be heavy and lasting 5-7 days. UPT today negative. Patient has not had unprotected intercourse in >1 month.     REVIEW OF SYSTEMS:   Constitutional: negative fever, negative chills   HEENT: negative visual disturbances, negative headaches  Respiratory: negative dyspnea, negative cough  Cardiovascular: negative chest pain,  negative palpitations  Gastrointestinal: negative abdominal pain, negative RUQ pain, negative N/V, negative diarrhea, negative constipation  Genitourinary: negative dysuria, negative vaginal discharge  Dermatological: negative rash  Hematologic: negative bruising  Immunologic/Lymphatic: negative recent illness, negative recent sick contact  Musculoskeletal: negative back pain, negative myalgias, negative arthralgias  Neurological:  negative dizziness, negative weakness  Behavior/Psych: negative depression, negative anxiety  __________________________________  OBSTETRICAL HISTORY:  OB History    Para Term  AB Living   8 6 5 1 2 6   SAB IAB Ectopic Molar Multiple Live Births   2       0 6      # Outcome Date GA Lbr Stanley/2nd Weight Sex Delivery Anes PTL Lv   8  20 36w0d  5 lb 13.1 oz (2.64 kg) M Vag-Spont None N CEFERINO      Complications: Abruptio Placenta   7 SAB  5w0d          6 Term 13 38w0d  7 lb 11 oz (3.487 kg) M  None  CEFERINO   5 Term 12 38w0d  6 lb 12 oz (3.062 kg) F  None N CEFERINO   4 Term 11 38w0d  6 lb 13 oz (3.09 kg) F  None N CEFERINO   3 Term 09 38w0d  7 lb 1 oz (3.204 kg) F   N CEFERINO   2 SAB            1 Term 07 38w0d  6 lb 11 oz (3.033 kg) F  EPI N CEFERINO      Obstetric Comments   G-8   New Partner, pt . G1-7 Same FOB        PAST MEDICAL HISTORY:      Diagnosis Date    Depression with anxiety 10/22/2019    History of  premature rupture of membranes ()     HSV-2 (herpes simplex virus 2) infection 2015    Initial outbreak. last  outbreak 10/8/19        PAST SURGICAL HISTORY:                                                                No past surgical history on file. MEDICATIONS:  Current Outpatient Medications   Medication Sig Dispense Refill    medroxyPROGESTERone (DEPO-PROVERA) 150 MG/ML injection inject 1 milliliter INTO THE MUSCLE ONCE FOR 1 DOSE. 1 mL 3    medroxyPROGESTERone (DEPO-PROVERA) 150 MG/ML injection use as directed inject 1 milliliter every 3 MONTHS (Patient not taking: Reported on 3/28/2022) 1 mL 0    Elastic Bandages & Supports (WRIST SPLINT) MISC Apply 1 each topically continuous Cock up wrist splint (Patient not taking: Reported on 2021) 1 each 0     Current Facility-Administered Medications   Medication Dose Route Frequency Provider Last Rate Last Admin    medroxyPROGESTERone (DEPO-PROVERA) injection 150 mg  150 mg IntraMUSCular Once [place] Height, DO           ALLERGIES:  Allergies as of 2022 - Fully Reviewed 2022   Allergen Reaction Noted    Clindamycin/lincomycin Hives 10/17/2018                                   VITALS:  Vitals:    22 1354   BP: 136/85   Pulse: 104   Weight: 178 lb (80.7 kg)                                                                                                                                                                         PHYSICAL EXAM:   General Appearance: Appears healthy. Alert; in no acute distress. Pleasant.   Skin: Normal  Respiratory: clear to auscultation, no wheezes, rales or rhonchi, symmetric air entry  Cardiovascular: regular rate and rhythm  Abdomen: soft, non-tender, non-distended, no right upper quadrant tenderness and no CVA tenderness  Pelvic Exam: not indicated at this visit  Rectal Exam: negative, stool guaiac negative  Extremities: non-tender BLE and non-edematous  Musculoskeletal: negative, no gross abnormalities  Psych: Normal.    DATA:  Results for POC orders placed in visit on 22   POCT urine pregnancy   Result Value Ref Range    Preg Test, Ur negative     Control present        ASSESSMENT & PLAN:    Won Montano is a 32 y.o. female [id number]    Dysmenorrhea    - Desires Depo-Provera for Hx dysmenorrhea    - UPT negative   - No intercourse in 1 month    - Depo given today, refills ordered     Patient Active Problem List    Diagnosis Date Noted    Postpartum state     History of spontaneous  2020    History of  premature rupture of membranes ()     Proteinuria (2+) 2020     P/c ordered : 0.22      Smoker 10/22/2019     4-8 cig per day. Pt attempting to cut back. Pt counseled on maternal/fetal risk factors. Pt verbalizes understanding       History of postpartum depression 10/22/2019     G3      Depression with anxiety 10/22/2019     10/22/19- Pt used to see therapist at [facility]. Pt took medication, but did not like how she felt.  Family history of sickle cell trait in father 10/22/2019    HSV-2 (herpes simplex virus 2) infection 2019     Initial Outbreak 2015. Last outbreak 10/8/19. Valtrex at 36 wks          Return for For annual.    Counseling Completed:  discussed need for repeat pap every 3 years, if negative. discussed birth control and barrier recommendations. discussed STD counseling and prevention. Tobacco & Secondary smoke risks discussed; with recommendation for cessation and avoidance. Routine health maintenance per patients PCP discussed. Patient was seen with total face to face time of 10 minutes.  More than 50% of this visit was on counseling and education regarding her diagnose(s) as listed below and options. She was also counseled on her preventative health maintenance recommendations and follow-up. Diagnosis Orders   1.  Dysmenorrhea  POCT urine pregnancy       Leander Locke DO  Ob/Gyn Resident  [facility], [facility]  3/28/2022, 2:00 PM

## 2022-05-02 RX ORDER — MEDROXYPROGESTERONE ACETATE 150 MG/ML
INJECTION, SUSPENSION INTRAMUSCULAR
Qty: 1 ML | Refills: 0 | OUTPATIENT
Start: 2022-05-02

## 2022-06-20 ENCOUNTER — NURSE ONLY (OUTPATIENT)
Dept: OBGYN | Age: 32
End: 2022-06-20
Payer: COMMERCIAL

## 2022-06-20 DIAGNOSIS — N94.6 DYSMENORRHEA: Primary | ICD-10-CM

## 2022-06-20 RX ORDER — MEDROXYPROGESTERONE ACETATE 150 MG/ML
150 INJECTION, SUSPENSION INTRAMUSCULAR ONCE
Status: COMPLETED | OUTPATIENT
Start: 2022-06-20 | End: 2022-06-20

## 2022-06-20 RX ADMIN — MEDROXYPROGESTERONE ACETATE 150 MG: 150 INJECTION, SUSPENSION, EXTENDED RELEASE INTRAMUSCULAR at 11:07

## 2022-06-20 NOTE — PROGRESS NOTES
After obtaining consent, and per orders of Dr. Aguilera Party, injection of Depo given in Left upper quad. gluteus by Vikash Pope MA. Patient instructed to remain in clinic for 20 minutes afterwards, and to report any adverse reaction to me immediately.

## 2022-06-21 ENCOUNTER — OFFICE VISIT (OUTPATIENT)
Dept: OBGYN | Age: 32
End: 2022-06-21
Payer: COMMERCIAL

## 2022-06-21 ENCOUNTER — HOSPITAL ENCOUNTER (OUTPATIENT)
Age: 32
Setting detail: SPECIMEN
Discharge: HOME | End: 2022-06-21

## 2022-06-21 VITALS
HEIGHT: 64 IN | SYSTOLIC BLOOD PRESSURE: 134 MMHG | DIASTOLIC BLOOD PRESSURE: 87 MMHG | WEIGHT: 185 LBS | BODY MASS INDEX: 31.58 KG/M2 | HEART RATE: 102 BPM

## 2022-06-21 DIAGNOSIS — Z01.419 WELL WOMAN EXAM: ICD-10-CM

## 2022-06-21 DIAGNOSIS — R61 EXCESSIVE SWEATING: ICD-10-CM

## 2022-06-21 DIAGNOSIS — Z01.419 WELL WOMAN EXAM: Primary | ICD-10-CM

## 2022-06-21 DIAGNOSIS — Z72.0 TOBACCO USE: ICD-10-CM

## 2022-06-21 PROCEDURE — 90651 9VHPV VACCINE 2/3 DOSE IM: CPT | Performed by: OBSTETRICS & GYNECOLOGY

## 2022-06-21 PROCEDURE — 99211 OFF/OP EST MAY X REQ PHY/QHP: CPT | Performed by: OBSTETRICS & GYNECOLOGY

## 2022-06-21 PROCEDURE — 99395 PREV VISIT EST AGE 18-39: CPT | Performed by: STUDENT IN AN ORGANIZED HEALTH CARE EDUCATION/TRAINING PROGRAM

## 2022-06-21 RX ORDER — NICOTINE 21 MG/24HR
1 PATCH, TRANSDERMAL 24 HOURS TRANSDERMAL DAILY
Qty: 14 PATCH | Refills: 5 | Status: SHIPPED | OUTPATIENT
Start: 2022-06-21 | End: 2022-07-05

## 2022-06-21 NOTE — PROGRESS NOTES
After obtaining consent, and per orders of Dr. Isaiah Parker, injection of Gardasil 9  given in Left deltoid by Dana García MA. Patient instructed to remain in clinic for 20 minutes afterwards, and to report any adverse reaction to me immediately.

## 2022-06-21 NOTE — PROGRESS NOTES
[facility] OB/GYN Annual Visit    Marlena Form  6/21/2022                       Primary Care Physician: RADHA Funez CNP    CC:   Chief Complaint   Patient presents with    Annual Exam     last pap 10/17/2018 neg , vag discharge with odor x 3 weeks          HPI: Marlena Form is a 32 y.o. female [id number]    The patient was seen and examined. She is here for an annual visit. She is complaining of vaginal discharge following amoxicillin. No LMP recorded (lmp unknown). Patient has had an injection. . She denies irregular menstrual cycles, denies heavy bleeding, and denies dysmenorrhea. She does not have periods because of her Depo shot. Patient is happy with her Depo aside from the weight gain and is considering switching to the Nexplanon. Her bowel habits are regular. She denies any bloating. She denies dysuria. She denies urinary leaking. She denies vaginal discharge. She is sexually active with has sex with her . She uses Depo-Provera for contraception and is not desiring pregnancy.     Depression Screen: Symptoms of decreased mood absent  Symptoms of anhedonia absent  **If either question is answered in a  positive fashion then complete the PHQ9 Scoring Evaluation and make the appropriate referral**    REVIEW OF SYSTEMS:   An 11 point review of systems was completed    Constitutional: negative fever, negative chills   HEENT: negative visual disturbances, negative headaches  Respiratory: negative dyspnea, negative cough  Cardiovascular: negative chest pain,  negative palpitations  Gastrointestinal: negative abdominal pain, negative RUQ pain, negative N/V, negative diarrhea, negative constipation  Genitourinary: negative dysuria, positive vaginal discharge  Dermatological: negative rash  Hematologic: negative bruising  Immunologic/Lymphatic: negative recent illness, negative recent sick contact  Musculoskeletal: negative back pain, negative myalgias, negative arthralgias  Neurological: negative dizziness, negative weakness  Behavior/Psych: negative depression, negative anxiety  ____________________________    GYNECOLOGICAL HISTORY:    Sexually Active: single Male partner, contraception - Depo-Provera  STD History: remote    Pap History: Last PAP was normal;     Permanent Sterilization: no  Reversible Birth Control: yes - Depo  HEALTH MAINTENANCE:  Garidisil immunization: patient received x2 doses in [address], received third dose today     OBSTETRICAL HISTORY:  OB History    Para Term  AB Living   8 6 5 1 2 6   SAB IAB Ectopic Molar Multiple Live Births   2 0 0 0 0 6      # Outcome Date GA Lbr Stanley/2nd Weight Sex Delivery Anes PTL Lv   8  20 36w0d  5 lb 13.1 oz (2.64 kg) M Vag-Spont None N CEFERINO      Complications: Abruptio Placenta      Name: Agnes Milly: 9  Apgar5: 9   7 2017 5w0d          6 Term 13 38w0d  7 lb 11 oz (3.487 kg) M  None  CEFERINO   5 Term 12 38w0d  6 lb 12 oz (3.062 kg) F  None N CEFERINO   4 Term 11 38w0d  6 lb 13 oz (3.09 kg) F  None N CEFERINO   3 Term 09 38w0d  7 lb 1 oz (3.204 kg) F   N CEFERINO   2 2008           1 Term 07 38w0d  6 lb 11 oz (3.033 kg) F  EPI N CEFERINO      Obstetric Comments   G-8   New Partner, pt . G1-7 Same FOB        PAST MEDICAL HISTORY:   has a past medical history of Depression with anxiety, History of  premature rupture of membranes (), and HSV-2 (herpes simplex virus 2) infection. PAST SURGICAL HISTORY:   has no past surgical history on file. ALLERGIES:  is allergic to clindamycin/lincomycin. MEDICATIONS:  Prior to Admission medications    Medication Sig Start Date End Date Taking?  Authorizing Provider   nicotine (NICODERM CQ) 14 MG/24HR Place 1 patch onto the skin daily for 14 days 22 Yes Nicolas Busby, DO   medroxyPROGESTERone (DEPO-PROVERA) 150 MG/ML injection inject 1 milliliter INTO THE MUSCLE ONCE FOR 1 DOSE. 21  Yes Enrrique Issa MD medroxyPROGESTERone (DEPO-PROVERA) 150 MG/ML injection Inject 1 mL into the muscle every 3 months  Patient not taking: Reported on 6/21/2022 3/28/22   Ron Noble DO   medroxyPROGESTERone (DEPO-PROVERA) 150 MG/ML injection use as directed inject 1 milliliter every 3 MONTHS  Patient not taking: Reported on 3/28/2022 2/3/22   [address] [place] [address] ([address]) [address] [facility] Ext 1 each topically continuous Cock up wrist splint  Patient not taking: Reported on 11/17/2021 8/6/21   RADHA Gordon - S[facility]MER       FAMILY HISTORY:  Family History of Breast, Ovarian, Colon or Uterine Cancer: No   family history includes Cancer in her maternal grandfather, paternal grandfather, and paternal grandmother; Diabetes in her mother; Hypertension in her father; No Known Problems in her brother and sister. SOCIAL HISTORY:   reports that she has been smoking cigarettes. She has been smoking about 0.25 packs per day. She has never used smokeless tobacco. She reports previous alcohol use. She reports that she does not use drugs. VITALS:  Vitals:    06/21/22 1459   BP: 134/87   Pulse: (!) 102   Weight: 185 lb (83.9 kg)   Height: 5' 4\" (1.626 m)                                                                                                                                                                           PHYSICAL EXAM:   General Appearance: Appears healthy. Alert; in no acute distress. Pleasant. Skin: Skin color, texture, turgor normal. No rashes or lesions. Respiratory: Normal expansion. Clear to auscultation. No rales, rhonchi, or wheezing.   Cardiovascular: Regular, rate and rhythm  Breast:  (Chest): normal appearance, no masses or tenderness  Abdomen: soft, non-tender, non-distended, no right upper quadrant tenderness and no CVA tenderness,  Chaperone for Intimate Exam:  No external lesions or erythema, normal appearing vaginal mucosa that is pink and moist, no blood noted in the vaginal canal or at the cervical os, small amount of thin physiologic discharge in vault, cervix without lesions or erythema   Musculoskeletal: no gross abnormalities  Extremities: non-tender BLE and non-edematous  Psych:  oriented to time, place and person     DATA:  No results found for this visit on 22. ASSESSMENT & PLAN:    pSencer Vee is a 32 y.o. female [id number]   - She requested her final dose of the Garidisil immunization, received 1st 2 doses in [address] [facility], [facility]   - She declined the influenza immunization   - She declined the [place] immunization   - Rx for nicotine patches sent as patient is trying to quit smoking    - Referral to dermatology sent for excessive sweating in axilla and under the breasts, counseled patient on changes in deodorant   - Pap smear and vaginitis collected today   - Depo-provera for contraception, patient considering switching to Nexplanon, instructed her to call and make appointment when she is ready    Patient Active Problem List    Diagnosis Date Noted    Postpartum state     History of spontaneous  2020    History of  premature rupture of membranes ()     Proteinuria (2+) 2020     P/c ordered : 0.22      Smoker 10/22/2019     4-8 cig per day. Pt attempting to cut back. Pt counseled on maternal/fetal risk factors. Pt verbalizes understanding       History of postpartum depression 10/22/2019     G3      Depression with anxiety 10/22/2019     10/22/19- Pt used to see therapist at [facility]. Pt took medication, but did not like how she felt.  Family history of sickle cell trait in father 10/22/2019    HSV-2 (herpes simplex virus 2) infection 2019     Initial Outbreak 2015. Last outbreak 10/8/19. Valtrex at 36 wks          Return in about 1 year (around 2023) for Annual or sooner for Nexplanon placement.   Risk Factors- New Partner ( pt  ), H/O HSV, smoker, f/h sickle cell trait ( pt father ), late to care, h/o pp depression, depression, anxiety   Last pap 2018. Cultures only at next visit  Agreeable to vaccinations in preg  Maternal quad screen ordered  10/22/19- [facility] referral placed for anatomy scan  VALTREX AT 36 WKS         Patient was seen with total face to face time of 30 minutes. More than 50% of this visit was on counseling and education regarding the problems listed below and her options. She was also counseled on her preventative health maintenance recommendations and follow-up. Diagnosis Orders   1. Well woman exam  PAP Smear    Vaginitis DNA Probe    Chlamydia Trachomatis & Neisseria gonorrhoeae (GC) by amplified detection   2. Excessive sweating  Radha Hill MD, Dermatology, [place]   3. Tobacco use          Rios Cartagena DO  Ob/Gyn Resident  [facility], [address]  6/21/2022, 4:00 PM        Attending Physician Statement  I have discussed the care of Jg Candelario, including pertinent history and exam findings,  with the resident. I have reviewed the key elements of all parts of the encounter with the resident. I agree with the assessment, plan and orders as documented by the resident.   (GE Modifier)      Elodia Blanca DO

## 2022-06-22 LAB
C TRACH DNA GENITAL QL NAA+PROBE: NEGATIVE
CANDIDA SPECIES, DNA PROBE: NEGATIVE
GARDNERELLA VAGINALIS, DNA PROBE: NEGATIVE
HPV SAMPLE: NORMAL
HPV, GENOTYPE 16: NOT DETECTED
HPV, GENOTYPE 18: NOT DETECTED
HPV, HIGH RISK OTHER: NOT DETECTED
HPV, INTERPRETATION: NORMAL
N. GONORRHOEAE DNA: NEGATIVE
SOURCE: NORMAL
SPECIMEN DESCRIPTION: NORMAL
SPECIMEN DESCRIPTION: NORMAL
TRICHOMONAS VAGINALIS DNA: NEGATIVE

## 2022-06-29 LAB — CYTOLOGY REPORT: NORMAL

## 2022-09-14 ENCOUNTER — TELEPHONE (OUTPATIENT)
Dept: OBGYN | Age: 32
End: 2022-09-14

## 2023-02-22 ENCOUNTER — OFFICE VISIT (OUTPATIENT)
Dept: PRIMARY CARE CLINIC | Age: 33
End: 2023-02-22
Payer: COMMERCIAL

## 2023-02-22 ENCOUNTER — HOSPITAL ENCOUNTER (OUTPATIENT)
Age: 33
Discharge: HOME | End: 2023-02-22
Payer: COMMERCIAL

## 2023-02-22 VITALS
DIASTOLIC BLOOD PRESSURE: 70 MMHG | OXYGEN SATURATION: 97 % | BODY MASS INDEX: 31.58 KG/M2 | SYSTOLIC BLOOD PRESSURE: 110 MMHG | WEIGHT: 184 LBS | HEART RATE: 87 BPM

## 2023-02-22 DIAGNOSIS — R00.2 HEART PALPITATIONS: Primary | ICD-10-CM

## 2023-02-22 DIAGNOSIS — R00.2 HEART PALPITATIONS: ICD-10-CM

## 2023-02-22 LAB
ALBUMIN SERPL-MCNC: 4.1 G/DL (ref 3.5–5.2)
ALBUMIN/GLOBULIN RATIO: 1.4 (ref 1–2.5)
ALP SERPL-CCNC: 51 U/L (ref 35–104)
ALT SERPL-CCNC: 8 U/L (ref 5–33)
ANION GAP SERPL CALCULATED.3IONS-SCNC: 12 MMOL/L (ref 9–17)
AST SERPL-CCNC: 11 U/L
BILIRUB SERPL-MCNC: 0.4 MG/DL (ref 0.3–1.2)
BUN SERPL-MCNC: 9 MG/DL (ref 6–20)
CALCIUM SERPL-MCNC: 9.2 MG/DL (ref 8.6–10.4)
CHLORIDE SERPL-SCNC: 106 MMOL/L (ref 98–107)
CO2 SERPL-SCNC: 20 MMOL/L (ref 20–31)
CREAT SERPL-MCNC: 0.67 MG/DL (ref 0.5–0.9)
GFR SERPL CREATININE-BSD FRML MDRD: >60 ML/MIN/1.73M2
GLUCOSE SERPL-MCNC: 95 MG/DL (ref 70–99)
HCT VFR BLD AUTO: 39.2 % (ref 36.3–47.1)
HGB BLD-MCNC: 13.3 G/DL (ref 11.9–15.1)
MCH RBC QN AUTO: 33.6 PG (ref 25.2–33.5)
MCHC RBC AUTO-ENTMCNC: 33.9 G/DL (ref 28.4–34.8)
MCV RBC AUTO: 99 FL (ref 82.6–102.9)
NRBC AUTOMATED: 0 PER 100 WBC
PDW BLD-RTO: 12.9 % (ref 11.8–14.4)
PLATELET # BLD AUTO: 279 K/UL (ref 138–453)
PMV BLD AUTO: 8.7 FL (ref 8.1–13.5)
POTASSIUM SERPL-SCNC: 3.9 MMOL/L (ref 3.7–5.3)
PROT SERPL-MCNC: 7 G/DL (ref 6.4–8.3)
RBC # BLD: 3.96 M/UL (ref 3.95–5.11)
SODIUM SERPL-SCNC: 138 MMOL/L (ref 135–144)
TSH SERPL-ACNC: 1.54 UIU/ML (ref 0.3–5)
WBC # BLD AUTO: 10.3 K/UL (ref 3.5–11.3)

## 2023-02-22 PROCEDURE — G8484 FLU IMMUNIZE NO ADMIN: HCPCS | Performed by: NURSE PRACTITIONER

## 2023-02-22 PROCEDURE — G8417 CALC BMI ABV UP PARAM F/U: HCPCS | Performed by: NURSE PRACTITIONER

## 2023-02-22 PROCEDURE — 4004F PT TOBACCO SCREEN RCVD TLK: CPT | Performed by: NURSE PRACTITIONER

## 2023-02-22 PROCEDURE — 80053 COMPREHEN METABOLIC PANEL: CPT

## 2023-02-22 PROCEDURE — 36415 COLL VENOUS BLD VENIPUNCTURE: CPT

## 2023-02-22 PROCEDURE — 93000 ELECTROCARDIOGRAM COMPLETE: CPT | Performed by: NURSE PRACTITIONER

## 2023-02-22 PROCEDURE — 99214 OFFICE O/P EST MOD 30 MIN: CPT | Performed by: NURSE PRACTITIONER

## 2023-02-22 PROCEDURE — G8427 DOCREV CUR MEDS BY ELIG CLIN: HCPCS | Performed by: NURSE PRACTITIONER

## 2023-02-22 PROCEDURE — 85027 COMPLETE CBC AUTOMATED: CPT

## 2023-02-22 PROCEDURE — 84443 ASSAY THYROID STIM HORMONE: CPT

## 2023-02-22 SDOH — ECONOMIC STABILITY: TRANSPORTATION INSECURITY
IN THE PAST 12 MONTHS, HAS LACK OF TRANSPORTATION KEPT YOU FROM MEETINGS, WORK, OR FROM GETTING THINGS NEEDED FOR DAILY LIVING?: YES

## 2023-02-22 SDOH — ECONOMIC STABILITY: HOUSING INSECURITY
IN THE LAST 12 MONTHS, WAS THERE A TIME WHEN YOU DID NOT HAVE A STEADY PLACE TO SLEEP OR SLEPT IN A SHELTER (INCLUDING NOW)?: NO

## 2023-02-22 SDOH — ECONOMIC STABILITY: FOOD INSECURITY: WITHIN THE PAST 12 MONTHS, YOU WORRIED THAT YOUR FOOD WOULD RUN OUT BEFORE YOU GOT MONEY TO BUY MORE.: SOMETIMES TRUE

## 2023-02-22 SDOH — ECONOMIC STABILITY: INCOME INSECURITY: HOW HARD IS IT FOR YOU TO PAY FOR THE VERY BASICS LIKE FOOD, HOUSING, MEDICAL CARE, AND HEATING?: SOMEWHAT HARD

## 2023-02-22 SDOH — ECONOMIC STABILITY: FOOD INSECURITY: WITHIN THE PAST 12 MONTHS, THE FOOD YOU BOUGHT JUST DIDN'T LAST AND YOU DIDN'T HAVE MONEY TO GET MORE.: SOMETIMES TRUE

## 2023-02-22 ASSESSMENT — ENCOUNTER SYMPTOMS
COUGH: 0
SHORTNESS OF BREATH: 0

## 2023-02-22 ASSESSMENT — PATIENT HEALTH QUESTIONNAIRE - PHQ9
8. MOVING OR SPEAKING SO SLOWLY THAT OTHER PEOPLE COULD HAVE NOTICED. OR THE OPPOSITE, BEING SO FIGETY OR RESTLESS THAT YOU HAVE BEEN MOVING AROUND A LOT MORE THAN USUAL: 0
10. IF YOU CHECKED OFF ANY PROBLEMS, HOW DIFFICULT HAVE THESE PROBLEMS MADE IT FOR YOU TO DO YOUR WORK, TAKE CARE OF THINGS AT HOME, OR GET ALONG WITH OTHER PEOPLE: 0
SUM OF ALL RESPONSES TO PHQ QUESTIONS 1-9: 5
SUM OF ALL RESPONSES TO PHQ9 QUESTIONS 1 & 2: 1
7. TROUBLE CONCENTRATING ON THINGS, SUCH AS READING THE NEWSPAPER OR WATCHING TELEVISION: 1
6. FEELING BAD ABOUT YOURSELF - OR THAT YOU ARE A FAILURE OR HAVE LET YOURSELF OR YOUR FAMILY DOWN: 1
SUM OF ALL RESPONSES TO PHQ QUESTIONS 1-9: 5
SUM OF ALL RESPONSES TO PHQ QUESTIONS 1-9: 5
1. LITTLE INTEREST OR PLEASURE IN DOING THINGS: 0
2. FEELING DOWN, DEPRESSED OR HOPELESS: 1
3. TROUBLE FALLING OR STAYING ASLEEP: 1
9. THOUGHTS THAT YOU WOULD BE BETTER OFF DEAD, OR OF HURTING YOURSELF: 0
SUM OF ALL RESPONSES TO PHQ QUESTIONS 1-9: 5
5. POOR APPETITE OR OVEREATING: 1
4. FEELING TIRED OR HAVING LITTLE ENERGY: 0

## 2023-02-22 NOTE — PROGRESS NOTES
Patient is present complaining of palpitations x1 month  States this comes and goes, states it is random  Denies chest pain, shortness of breath

## 2023-02-22 NOTE — PROGRESS NOTES
Anny Childress (:  1990) is a 28 y.o. female,Established patient, here for evaluation of the following chief complaint(s):  Palpitations         ASSESSMENT/PLAN:  1. Heart palpitations  -     CBC; Future  -     TSH; Future  -     Comprehensive Metabolic Panel; Future  -     EKG 12 lead; Future  -     Holter Monitor 48 Hour; Future    No follow-ups on file. Subjective   SUBJECTIVE/OBJECTIVE:  HPI  Pt states she has been noticing her heart fluttering. First she thought it was from red bulls but she quit them. This has been going on for a few months. It seems to happen more now. Nothing seems to make it happen. Nothing makes it go away, lasts for a few seconds. Usually at work when it happens. She works night shift. She is still consuming caffeine but trying to keep it to a minimum. Review of Systems   Constitutional:  Negative for chills and fever. Respiratory:  Negative for cough and shortness of breath. Cardiovascular:  Positive for palpitations. Negative for chest pain and leg swelling. Neurological:  Negative for seizures, syncope, light-headedness and headaches. Objective   Vitals:    23 1312   BP: 110/70   Pulse: 87   SpO2: 97%     Wt Readings from Last 3 Encounters:   23 184 lb (83.5 kg)   22 185 lb (83.9 kg)   22 178 lb (80.7 kg)       Physical Exam  Constitutional:       Appearance: She is well-developed. HENT:      Right Ear: External ear normal.      Left Ear: External ear normal.      Nose: Nose normal.   Cardiovascular:      Rate and Rhythm: Normal rate and regular rhythm. Heart sounds: Normal heart sounds, S1 normal and S2 normal.   Pulmonary:      Effort: Pulmonary effort is normal. No respiratory distress. Breath sounds: Normal breath sounds. Musculoskeletal:         General: No deformity. Normal range of motion. Cervical back: Full passive range of motion without pain and normal range of motion.    Skin:     General: Skin is warm and dry. Neurological:      Mental Status: She is alert and oriented to person, place, and time. An electronic signature was used to authenticate this note.     --RADHA Rush - CNP

## 2023-03-03 ENCOUNTER — HOSPITAL ENCOUNTER (OUTPATIENT)
Age: 33
Setting detail: SPECIMEN
Discharge: HOME | End: 2023-03-03

## 2023-03-03 ENCOUNTER — OFFICE VISIT (OUTPATIENT)
Dept: OBGYN | Age: 33
End: 2023-03-03
Payer: COMMERCIAL

## 2023-03-03 VITALS
HEART RATE: 89 BPM | DIASTOLIC BLOOD PRESSURE: 80 MMHG | WEIGHT: 185 LBS | BODY MASS INDEX: 31.76 KG/M2 | SYSTOLIC BLOOD PRESSURE: 122 MMHG

## 2023-03-03 DIAGNOSIS — N89.8 VAGINAL ODOR: ICD-10-CM

## 2023-03-03 DIAGNOSIS — Z20.2 POSSIBLE EXPOSURE TO STD: ICD-10-CM

## 2023-03-03 DIAGNOSIS — N89.8 VAGINAL ODOR: Primary | ICD-10-CM

## 2023-03-03 DIAGNOSIS — Z01.419 WELL WOMAN EXAM: ICD-10-CM

## 2023-03-03 LAB
CANDIDA SPECIES, DNA PROBE: NEGATIVE
GARDNERELLA VAGINALIS, DNA PROBE: POSITIVE
HAV IGM SER QL: NONREACTIVE
HBV CORE IGM SER QL: NONREACTIVE
HBV SURFACE AG SER QL: NONREACTIVE
HCV AB SER QL: NONREACTIVE
HIV 1+2 AB+HIV1 P24 AG SERPL QL IA: NONREACTIVE
SOURCE: ABNORMAL
T PALLIDUM AB SER QL IA: NONREACTIVE
TRICHOMONAS VAGINALIS DNA: NEGATIVE

## 2023-03-03 PROCEDURE — 99211 OFF/OP EST MAY X REQ PHY/QHP: CPT | Performed by: STUDENT IN AN ORGANIZED HEALTH CARE EDUCATION/TRAINING PROGRAM

## 2023-03-03 NOTE — PROGRESS NOTES
OB/GYN Problem Visit    Gracia Medeiros  3/3/2023                       Primary Care Physician: Bobo Guerrero, APRN - CNP    CC:   Chief Complaint   Patient presents with    Irregular Menses     Patient stopped depo 9/2022 and has had irregular periods since    Vaginal Discharge     Patient is requesting cultures         HPI: Gracia Medeiros is a 28 y.o. female [id number]    The patient was seen and examined. She is here to discuss abnormal cycles. Patient states she was on the Depo for a long period of time and stopped taking it in August 2022. Patient states since this date she has not resumed normal cycles. However, patient is on her period today and believes this is her last day of bleeding. She states the bleeding was heavy and bright read for a few days and now has lightened. Patient is currently sexually active and using condoms with her  to prevent pregnancy. Patient took a pregnancy test 2 weeks ago that was negative. She is not currently desiring pregnancy. Patient also states she has noticed a vaginal odor and would like to be tested for STIs today. Her Patient's last menstrual period was 02/23/2023 (exact date).      She reports intermittent palpitations for which she is following her cardiologist.      REVIEW OF SYSTEMS:   Constitutional: negative fever, negative chills   HEENT: negative visual disturbances, negative headaches  Respiratory: negative dyspnea, negative cough  Cardiovascular: negative chest pain,  negative palpitations  Gastrointestinal: negative abdominal pain, negative RUQ pain, negative N/V, negative diarrhea, negative constipation  Genitourinary: negative dysuria, + vaginal bleeding  Dermatological: negative rash  Hematologic: negative bruising  Immunologic/Lymphatic: negative recent illness, negative recent sick contact  Musculoskeletal: negative back pain, negative myalgias, negative arthralgias  Neurological:  negative dizziness, negative weakness  Behavior/Psych: negative depression, negative anxiety      OBSTETRICAL HISTORY:  OB History    Para Term  AB Living   8 6 5 1 2 6   SAB IAB Ectopic Molar Multiple Live Births   2       0 6      # Outcome Date GA Lbr Stanley/2nd Weight Sex Delivery Anes PTL Lv   8  20 36w0d  5 lb 13.1 oz (2.64 kg) M Vag-Spont None N CEFERINO      Complications: Abruptio Placenta   7 2017 5w0d          6 Term 13 38w0d  7 lb 11 oz (3.487 kg) M  None  CEFERINO   5 Term 12 38w0d  6 lb 12 oz (3.062 kg) F  None N CEFERINO   4 Term 11 38w0d  6 lb 13 oz (3.09 kg) F  None N CEFERINO   3 Term 09 38w0d  7 lb 1 oz (3.204 kg) F   N CEFERINO   2 SAB            1 Term 07 38w0d  6 lb 11 oz (3.033 kg) F  EPI N CEFERINO      Obstetric Comments   G-8   New Partner, pt . G1-7 Same FOB        PAST MEDICAL HISTORY:      Diagnosis Date    Depression with anxiety 10/22/2019    History of  premature rupture of membranes ()     HSV-2 (herpes simplex virus 2) infection 2015    Initial outbreak. last  outbreak 10/8/19        PAST SURGICAL HISTORY:                                                                No past surgical history on file. MEDICATIONS:  No current outpatient medications on file. No current facility-administered medications for this visit. ALLERGIES:  Allergies as of 2023 - Fully Reviewed 2023   Allergen Reaction Noted    Clindamycin/lincomycin Hives 10/17/2018                                   VITALS:  Vitals:    23 0908   BP: 122/80   Site: Left Upper Arm   Position: Sitting   Cuff Size: Medium Adult   Pulse: 89   Weight: 185 lb (83.9 kg)                                                                                                                                                                         PHYSICAL EXAM:   Chaperone for Intimate Exam: Patient offered chaperone and declined    General Appearance: Appears healthy. Alert; in no acute distress. Pleasant.   Respiratory: normal chest wall rise bilaterally, no increased work of breathing  Cardiovascular: regular rate and rhythm  Abdomen: soft, non-tender, non-distended, no right upper quadrant tenderness, and no CVA tenderness  Pelvic Exam:   No external lesions or erythema, normal appearing vaginal mucosa that is pink and moist, blood noted in the vaginal canal and at the cervical consistent with menses, small amount of thin physiologic discharge in vault, cervix without lesions or erythema  Extremities: non-tender BLE and non-edematous  Musculoskeletal: no gross abnormalities  Psych: Normal. and Alert and oriented, appropriate affect. DATA:  No results found for this visit on 23. ASSESSMENT & PLAN:    Hortencia Armstrong is a 28 y.o. female [id number]    Abnormal Cycles   - Patient reports abnormal cycles since discontinuing Depo-Provera 2022   - Discussed with patient amenorrheic changes up to 18 months after discontinuing Depo    - Patient on period currently   - Will continue to monitor cycles, recommend follow up for annual or sooner for concerns    STI testing   - Vaginitis, GC collected    - Tpal, HIV, Hep panel sent    Patient Active Problem List    Diagnosis Date Noted    Postpartum state     History of spontaneous  2020    History of  premature rupture of membranes ()     Proteinuria (2+) 2020     P/c ordered : 0.22      Smoker 10/22/2019     4-8 cig per day. Pt attempting to cut back. Pt counseled on maternal/fetal risk factors. Pt verbalizes understanding       History of postpartum depression 10/22/2019     G3      Depression with anxiety 10/22/2019     10/22/19- Pt used to see therapist at [address] [place] [address]. Pt took medication, but did not like how she felt. Family history of sickle cell trait in father 10/22/2019    HSV-2 (herpes simplex virus 2) infection 2019     Initial Outbreak 2015. Last outbreak 10/8/19.   Valtrex at 36 wks          Return in about 3 months (around 6/3/2023) for Annual and to f/u on cycles. Patient was seen with total face to face time of 20 minutes. More than 50% of this visit was on counseling and education regarding her diagnose(s) as listed below and options. She was also counseled on her preventative health maintenance recommendations and follow-up. Diagnosis Orders   1. Vaginal odor  C.trachomatis N.gonorrhoeae DNA    Vaginitis DNA Probe      2. Possible exposure to STD  T. Pallidum Ab    Hepatitis Panel, Acute    HIV Screen          Neris Sheth DO  Ob/Gyn Resident  [facility], [facility]  3/3/2023, 9:35 AM         Attending Physician Statement  I have discussed the care of Monik Fraser, including pertinent history and exam findings,  with the resident. I have reviewed the key elements of all parts of the encounter with the resident. I agree with the assessment, plan and orders as documented by the resident.   (GE Modifier)    Nabila Valente DO

## 2023-03-07 ENCOUNTER — TELEPHONE (OUTPATIENT)
Dept: OBGYN | Age: 33
End: 2023-03-07

## 2023-03-07 RX ORDER — METRONIDAZOLE 500 MG/1
500 TABLET ORAL 2 TIMES DAILY
Qty: 14 TABLET | Refills: 0 | Status: SHIPPED | OUTPATIENT
Start: 2023-03-07 | End: 2023-03-14

## 2023-03-07 NOTE — TELEPHONE ENCOUNTER
Patient saw her culture results on MyChart and requesting treatment for bacterial vaginosis. She was also seen on 3/3/23 and forgot to request a script for B12.

## 2023-03-08 NOTE — TELEPHONE ENCOUNTER
Flagyl script was ordered as printed. Writer called order for Flagyl 500 mg to [place]  at [facility].

## 2023-03-14 ENCOUNTER — HOSPITAL ENCOUNTER (OUTPATIENT)
Dept: NON INVASIVE DIAGNOSTICS | Age: 33
Discharge: HOME | End: 2023-03-14
Payer: COMMERCIAL

## 2023-03-14 DIAGNOSIS — R00.2 HEART PALPITATIONS: ICD-10-CM

## 2023-03-14 PROCEDURE — 93242 EXT ECG>48HR<7D RECORDING: CPT

## 2023-03-14 PROCEDURE — 93243 EXT ECG>48HR<7D SCAN A/R: CPT

## 2023-12-13 ENCOUNTER — HOSPITAL ENCOUNTER (OUTPATIENT)
Age: 33
Setting detail: SPECIMEN
Discharge: HOME | End: 2023-12-13

## 2023-12-13 ENCOUNTER — OFFICE VISIT (OUTPATIENT)
Dept: OBGYN | Age: 33
End: 2023-12-13
Payer: COMMERCIAL

## 2023-12-13 VITALS
SYSTOLIC BLOOD PRESSURE: 143 MMHG | BODY MASS INDEX: 30.56 KG/M2 | WEIGHT: 179 LBS | HEART RATE: 94 BPM | DIASTOLIC BLOOD PRESSURE: 87 MMHG | HEIGHT: 64 IN

## 2023-12-13 DIAGNOSIS — Z00.00 PREVENTATIVE HEALTH CARE: ICD-10-CM

## 2023-12-13 DIAGNOSIS — N89.8 VAGINAL DISCHARGE: Primary | ICD-10-CM

## 2023-12-13 PROCEDURE — 90651 9VHPV VACCINE 2/3 DOSE IM: CPT | Performed by: OBSTETRICS & GYNECOLOGY

## 2023-12-13 PROCEDURE — 99211 OFF/OP EST MAY X REQ PHY/QHP: CPT

## 2023-12-13 PROCEDURE — G8427 DOCREV CUR MEDS BY ELIG CLIN: HCPCS

## 2023-12-13 PROCEDURE — G8417 CALC BMI ABV UP PARAM F/U: HCPCS

## 2023-12-13 PROCEDURE — G8484 FLU IMMUNIZE NO ADMIN: HCPCS

## 2023-12-13 PROCEDURE — 4004F PT TOBACCO SCREEN RCVD TLK: CPT

## 2023-12-13 PROCEDURE — 99213 OFFICE O/P EST LOW 20 MIN: CPT

## 2023-12-14 DIAGNOSIS — N89.8 VAGINAL DISCHARGE: ICD-10-CM

## 2023-12-14 LAB
CANDIDA SPECIES: NEGATIVE
GARDNERELLA VAGINALIS: POSITIVE
SOURCE: ABNORMAL
TRICHOMONAS: NEGATIVE

## 2023-12-14 NOTE — PROGRESS NOTES
Attending Physician Statement  I have discussed the care of Toni Prom, including pertinent history and exam findings,  with the resident. I have reviewed the key elements of all parts of the encounter with the resident. I agree with the assessment, plan and orders as documented by the resident.   (GE Modifier)    Gamaliel Rivers DO
Patient was given HPV #2 in the Left Deltoid.  Per doctor Liliam Piedra  NDC# 1780-2249-14  LOT# [id number]  Exp date- 9/20/25  Patient tolerated well without difficulty
38w0d  3.204 kg (7 lb 1 oz) F   N CEFERINO   2 SAB 2008           1 Term 07 38w0d  3.033 kg (6 lb 11 oz) F  EPI N CEFERINO      Obstetric Comments   G-8   New Partner, pt . G1-7 Same FOB        PAST MEDICAL HISTORY:      Diagnosis Date    Depression with anxiety 10/22/2019    History of  premature rupture of membranes ()     HSV-2 (herpes simplex virus 2) infection 2015    Initial outbreak. last  outbreak 10/8/19        PAST SURGICAL HISTORY:                                                                History reviewed. No pertinent surgical history. MEDICATIONS:  Current Outpatient Medications   Medication Sig Dispense Refill    cyanocobalamin ([facility] VITAMIN B12) 1000 MCG tablet Take 1 tablet by mouth daily 30 tablet 3     No current facility-administered medications for this visit.        ALLERGIES:  Allergies as of 2023 - Fully Reviewed 2023   Allergen Reaction Noted    Clindamycin/lincomycin Hives 10/17/2018                                   VITALS:  Vitals:    23 1539   BP: (!) 143/87   Site: Left Upper Arm   Position: Sitting   Cuff Size: Medium Adult   Pulse: 94   Weight: 81.2 kg (179 lb)   Height: 1.626 m (5' 4\")                                                                                                                                                                         PHYSICAL EXAM:   Chaperone for Intimate Exam: Chaperone was present for entire exam, Chaperone Name: JAGUAR Gonzalez    General appearance:  no apparent distress, alert, and cooperative  HEENT: head atraumatic, normocephalic, moist mucous membranes, trachea midline  Neurologic:  alert, oriented, normal speech, no focal findings or movement disorder noted  Lungs:  No increased work of breathing, good air exchange, clear to auscultation bilaterally, no crackles or wheezing  Heart:  regular rate and rhythm and no murmur    Abdomen:  soft, and non-tender, no guarding, rebound  or rigidity  Extremities:

## 2023-12-15 DIAGNOSIS — N76.0 BV (BACTERIAL VAGINOSIS): Primary | ICD-10-CM

## 2023-12-15 DIAGNOSIS — B96.89 BV (BACTERIAL VAGINOSIS): Primary | ICD-10-CM

## 2023-12-15 LAB
C TRACH DNA SPEC QL PROBE+SIG AMP: NEGATIVE
N GONORRHOEA DNA SPEC QL PROBE+SIG AMP: NEGATIVE
SPECIMEN DESCRIPTION: NORMAL

## 2023-12-15 RX ORDER — METRONIDAZOLE 500 MG/1
500 TABLET ORAL 2 TIMES DAILY
Qty: 14 TABLET | Refills: 0 | Status: SHIPPED | OUTPATIENT
Start: 2023-12-15 | End: 2023-12-22

## 2024-03-07 ENCOUNTER — HOSPITAL ENCOUNTER (OUTPATIENT)
Age: 34
Setting detail: SPECIMEN
Discharge: HOME | End: 2024-03-07

## 2024-03-07 ENCOUNTER — OFFICE VISIT (OUTPATIENT)
Dept: OBGYN | Age: 34
End: 2024-03-07
Payer: COMMERCIAL

## 2024-03-07 VITALS
WEIGHT: 177 LBS | BODY MASS INDEX: 30.22 KG/M2 | DIASTOLIC BLOOD PRESSURE: 80 MMHG | HEIGHT: 64 IN | SYSTOLIC BLOOD PRESSURE: 122 MMHG | HEART RATE: 68 BPM

## 2024-03-07 DIAGNOSIS — F17.200 SMOKER: ICD-10-CM

## 2024-03-07 DIAGNOSIS — Z01.419 WELL WOMAN EXAM: Primary | ICD-10-CM

## 2024-03-07 DIAGNOSIS — Z01.419 WELL WOMAN EXAM: ICD-10-CM

## 2024-03-07 PROCEDURE — 90651 9VHPV VACCINE 2/3 DOSE IM: CPT | Performed by: OBSTETRICS & GYNECOLOGY

## 2024-03-07 PROCEDURE — 99212 OFFICE O/P EST SF 10 MIN: CPT | Performed by: STUDENT IN AN ORGANIZED HEALTH CARE EDUCATION/TRAINING PROGRAM

## 2024-03-07 RX ORDER — NICOTINE 21 MG/24HR
1 PATCH, TRANSDERMAL 24 HOURS TRANSDERMAL DAILY
Qty: 21 PATCH | Refills: 0 | Status: SHIPPED | OUTPATIENT
Start: 2024-03-07 | End: 2024-03-28

## 2024-03-08 DIAGNOSIS — Z01.419 WELL WOMAN EXAM: ICD-10-CM

## 2024-03-08 LAB
CANDIDA SPECIES: NEGATIVE
GARDNERELLA VAGINALIS: POSITIVE
HAV IGM SERPL QL IA: NONREACTIVE
HBV CORE IGM SERPL QL IA: NONREACTIVE
HBV SURFACE AG SERPL QL IA: NONREACTIVE
HCV AB SERPL QL IA: NONREACTIVE
HIV 1+2 AB+HIV1 P24 AG SERPL QL IA: NONREACTIVE
N GONORRHOEA DNA SPEC QL PROBE+SIG AMP: NORMAL
SOURCE: ABNORMAL
SPECIMEN DESCRIPTION: NORMAL
T PALLIDUM AB SER QL IA: NONREACTIVE
TRICHOMONAS: NEGATIVE

## 2024-03-08 NOTE — PROGRESS NOTES
Attending Physician Statement  I have discussed the care of Rachel Rodriguez, including pertinent history and exam findings,  with the resident. I have reviewed the key elements of all parts of the encounter with the resident.  I agree with the assessment, plan and orders as documented by the resident.  (GE Modifier)    Mary Oswald,    
in about 1 year (around 3/7/2025) for Annual.    No Patient Care Coordination Note on file.      Counseling Completed:    Counseled about need for repeat pap as per American Society for Colposcopy and Cervical Pathology guidelines.  Counseled about need for mammograms every 1year, If >41 yo and last mammogram was negative.  Counseled about birth control and barrier recommendations.  Counseled about STD counseling and prevention.  Counseled about Gardasil counseling for all patients 9-46 yo.  Counseled about Hereditary Breast, Ovarian, Colon and Uterine Cancer screening.  Tobacco & Secondary smoke risks discussed; with recommendation for cessation and avoidance.  Routine health maintenance per patients PCP discussed.    Patient was seen with total face to face time of 20 minutes. More than 50% of this visit was on counseling and education regarding the problems listed below and her options. She was also counseled on her preventative health maintenance recommendations and follow-up.   Diagnosis Orders   1. Well woman exam  Vaginitis DNA Probe    C.trachomatis N.gonorrhoeae DNA    HIV Screen    Hepatitis Panel, Acute    T. Pallidum Ab      2. Smoker             Deirdre Marks DO  Ob/Gyn Resident  [facility], [facility]  3/7/2024, 1:59 PM

## 2024-03-10 RX ORDER — METRONIDAZOLE 500 MG/1
500 TABLET ORAL 2 TIMES DAILY
Qty: 14 TABLET | Refills: 0 | Status: SHIPPED | OUTPATIENT
Start: 2024-03-10 | End: 2024-03-17

## 2024-03-23 ENCOUNTER — HOSPITAL ENCOUNTER (EMERGENCY)
Age: 34
Discharge: HOME | End: 2024-03-23
Attending: EMERGENCY MEDICINE
Payer: COMMERCIAL

## 2024-03-23 VITALS
SYSTOLIC BLOOD PRESSURE: 136 MMHG | RESPIRATION RATE: 16 BRPM | OXYGEN SATURATION: 100 % | BODY MASS INDEX: 31.26 KG/M2 | TEMPERATURE: 97.2 F | WEIGHT: 182.1 LBS | DIASTOLIC BLOOD PRESSURE: 82 MMHG | HEART RATE: 74 BPM

## 2024-03-23 DIAGNOSIS — K08.89 PAIN, DENTAL: Primary | ICD-10-CM

## 2024-03-23 PROCEDURE — 99283 EMERGENCY DEPT VISIT LOW MDM: CPT

## 2024-03-23 PROCEDURE — 6370000000 HC RX 637 (ALT 250 FOR IP)

## 2024-03-23 RX ORDER — PENICILLIN V POTASSIUM 500 MG/1
500 TABLET ORAL 4 TIMES DAILY
Qty: 28 TABLET | Refills: 0 | Status: SHIPPED | OUTPATIENT
Start: 2024-03-23 | End: 2024-03-30

## 2024-03-23 RX ORDER — PENICILLIN V POTASSIUM 250 MG/1
500 TABLET ORAL ONCE
Status: COMPLETED | OUTPATIENT
Start: 2024-03-23 | End: 2024-03-23

## 2024-03-23 RX ADMIN — PENICILLIN V POTASSIUM 500 MG: 250 TABLET ORAL at 07:44

## 2024-03-23 ASSESSMENT — ENCOUNTER SYMPTOMS
SORE THROAT: 0
TROUBLE SWALLOWING: 0

## 2024-03-23 NOTE — ED PROVIDER NOTES
[facility]     Emergency Department     Faculty Note/ Attestation      Pt Name: Rachel Rodriguez                                       MRN: [id number]  Birthdate 1990  Date of evaluation: 3/23/2024  Note Started: 7:34 AM EDT    Patients PCP:    Kacie Whitley, APRN - CNP    Attestation  I performed a history and physical examination of the patient and discussed management with the resident. I reviewed the resident’s note and agree with the documented findings and plan of care. Any areas of disagreement are noted on the chart. I was personally present for the key portions of any procedures. I have documented in the chart those procedures where I was not present during the key portions. I have reviewed the emergency nurses triage note. I agree with the chief complaint, past medical history, past surgical history, allergies, medications, social and family history as documented unless otherwise noted below.    For Physician Assistant/ Nurse Practitioner cases/documentation I have personally evaluated this patient and have completed at least one if not all key elements of the E/M (history, physical exam, and MDM). Additional findings are as noted.    Initial Screens:             Vitals:    Vitals:    03/23/24 0732 03/23/24 0733   BP: 136/82    Pulse: 74    Resp: 16    Temp:  97.2 °F (36.2 °C)   TempSrc:  Oral   SpO2: 100%        CHIEF COMPLAINT       Chief Complaint   Patient presents with    Dental Pain     Ongoing pain; removal on 4/1/24.       The pt is a 32 YO with RU mollar that needed to be pulled has been putting it off has until the 1st of April.          EMERGENCY DEPARTMENT COURSE:     -------------------------  BP: 136/82, Temp: 97.2 °F (36.2 °C), Pulse: 74, Respirations: 16   The patient has no uvular deviation, no palatal elevation, no difficulty speaking, no trismus, no muffled voice, no tongue swelling, no tonsillar abscess.    The pts upper 3rd molar on the right is not

## 2024-03-23 NOTE — DISCHARGE INSTRUCTIONS
You were seen in the emergency department for dental pain.  Your vital signs were stable.  No fever noted.  There was a cracked right upper molar noted.  No dental abscess.  You are only requesting antibiotics at this time.  We gave you your first dose in the emergency department.    I have sent antibiotics to the pharmacy.  Please take these as prescribed to complete the entire antibiotic course.  If your pain does worsen, recommend alternating Tylenol and ibuprofen as needed.    Please go to your dentist appointment on 4/1/2024.  Please follow-up with your primary care provider in 1 week given recent ER visit.  Please return to the emergency department if your pain worsens or if you develop any fevers, difficulty swallowing, or any other concerns.

## 2024-03-23 NOTE — ED PROVIDER NOTES
[facility] ED  Emergency Department Encounter  Emergency Medicine Resident     Pt Name:Rachel Rodriguez  MRN: [id number]  Birthdate 1990  Date of evaluation: 3/23/24  PCP:  Kacie Whitley APRN - CNP  Note Started: 7:29 AM EDT      CHIEF COMPLAINT       Chief Complaint   Patient presents with    Dental Pain     Ongoing pain; removal on 24.       HISTORY OF PRESENT ILLNESS  (Location/Symptom, Timing/Onset, Context/Setting, Quality, Duration, Modifying Factors, Severity.)      Rachel Rodriguez is a 33 y.o. female who presents with dental pain.  Patient has been having issues with the right upper molar for the last several months.  She was supposed to have it removed several months ago but states she could not afford that at that time so she deferred her appointment.  She is scheduled for her tooth to be extracted on .  She started having pain yesterday.  She has been trying some home remedies at home.  She came to the ER this morning for antibiotics that will last her until her appointment.  She denies any fevers or chills.  She is still tolerating oral intake.  Denies any difficulty breathing or shortness of breath.    PAST MEDICAL / SURGICAL / SOCIAL / FAMILY HISTORY      has a past medical history of Depression with anxiety, History of  premature rupture of membranes (2020), and HSV-2 (herpes simplex virus 2) infection.     has no past surgical history on file.    Social History     Socioeconomic History    Marital status:      Spouse name: Not on file    Number of children: Not on file    Years of education: Not on file    Highest education level: Not on file   Occupational History    Not on file   Tobacco Use    Smoking status: Every Day     Current packs/day: 0.25     Types: Cigarettes    Smokeless tobacco: Never    Tobacco comments:     \"6 cigs/day\"     Vaping Use    Vaping Use: Never used   Substance and Sexual Activity    Alcohol use: Not Currently     Comment:  3/7/24 3/28/24  Deirdre Marks DO   cyanocobalamin ([facility] VITAMIN B12) 1000 MCG tablet Take 1 tablet by mouth daily 3/7/23   Deirdre Marks DO       REVIEW OF SYSTEMS       Review of Systems   Constitutional:  Negative for chills and fever.   HENT:  Positive for dental problem. Negative for drooling, sore throat and trouble swallowing.    Respiratory:  Negative for shortness of breath.        PHYSICAL EXAM      INITIAL VITALS:   /82   Pulse 74   Temp 97.2 °F (36.2 °C) (Oral)   Resp 16   Wt 82.6 kg (182 lb 1.6 oz)   LMP 02/25/2024 (Exact Date)   SpO2 100%   BMI 31.26 kg/m²     Physical Exam  Constitutional:       General: She is not in acute distress.     Appearance: She is not toxic-appearing.   HENT:      Head: Normocephalic and atraumatic.      Right Ear: External ear normal.      Left Ear: External ear normal.      Nose: Nose normal.      Mouth/Throat:      Mouth: Mucous membranes are moist.      Dentition: No dental abscesses.      Pharynx: Oropharynx is clear. Uvula midline. No pharyngeal swelling, oropharyngeal exudate, posterior oropharyngeal erythema or uvula swelling.      Tonsils: No tonsillar exudate or tonsillar abscesses.        Comments: Cracked tooth noted to right upper molar, identified in red  Eyes:      Extraocular Movements: Extraocular movements intact.      Conjunctiva/sclera: Conjunctivae normal.   Cardiovascular:      Rate and Rhythm: Normal rate.   Pulmonary:      Effort: Pulmonary effort is normal.   Musculoskeletal:         General: Normal range of motion.      Cervical back: Normal range of motion.   Neurological:      General: No focal deficit present.      Mental Status: She is alert and oriented to person, place, and time.           DDX/DIAGNOSTIC RESULTS / EMERGENCY DEPARTMENT COURSE / MDM     Medical Decision Making  33-year-old female who presents with dental pain.  Patient has been having issues with the right upper molar for the last several months.  She was

## 2024-03-23 NOTE — ED TRIAGE NOTES
Pt presents to ED room 28 ambulatory from triage c/o R upper dental pain that has been ongoing for awhile. Pt states she was using red cross cream on the tooth for the pain. Pt denies pain at this time. Pt reports that she is having the tooth removed on 4/1/24.

## 2025-04-09 ENCOUNTER — HOSPITAL ENCOUNTER (OUTPATIENT)
Age: 35
Setting detail: SPECIMEN
Discharge: HOME | End: 2025-04-09

## 2025-04-09 ENCOUNTER — OFFICE VISIT (OUTPATIENT)
Dept: PRIMARY CARE CLINIC | Age: 35
End: 2025-04-09
Payer: COMMERCIAL

## 2025-04-09 VITALS
HEART RATE: 82 BPM | SYSTOLIC BLOOD PRESSURE: 111 MMHG | OXYGEN SATURATION: 100 % | DIASTOLIC BLOOD PRESSURE: 80 MMHG | BODY MASS INDEX: 29.37 KG/M2 | TEMPERATURE: 97.3 F | HEIGHT: 64 IN | WEIGHT: 172 LBS

## 2025-04-09 DIAGNOSIS — R10.2 PELVIC PAIN: ICD-10-CM

## 2025-04-09 DIAGNOSIS — Z79.899 MEDICATION MANAGEMENT: ICD-10-CM

## 2025-04-09 DIAGNOSIS — Z13.29 SCREENING FOR THYROID DISORDER: Primary | ICD-10-CM

## 2025-04-09 PROCEDURE — 99214 OFFICE O/P EST MOD 30 MIN: CPT | Performed by: NURSE PRACTITIONER

## 2025-04-09 PROCEDURE — 4004F PT TOBACCO SCREEN RCVD TLK: CPT | Performed by: NURSE PRACTITIONER

## 2025-04-09 PROCEDURE — G8419 CALC BMI OUT NRM PARAM NOF/U: HCPCS | Performed by: NURSE PRACTITIONER

## 2025-04-09 PROCEDURE — G8427 DOCREV CUR MEDS BY ELIG CLIN: HCPCS | Performed by: NURSE PRACTITIONER

## 2025-04-09 SDOH — ECONOMIC STABILITY: FOOD INSECURITY: WITHIN THE PAST 12 MONTHS, YOU WORRIED THAT YOUR FOOD WOULD RUN OUT BEFORE YOU GOT MONEY TO BUY MORE.: NEVER TRUE

## 2025-04-09 SDOH — ECONOMIC STABILITY: FOOD INSECURITY: WITHIN THE PAST 12 MONTHS, THE FOOD YOU BOUGHT JUST DIDN'T LAST AND YOU DIDN'T HAVE MONEY TO GET MORE.: NEVER TRUE

## 2025-04-09 ASSESSMENT — PATIENT HEALTH QUESTIONNAIRE - PHQ9
7. TROUBLE CONCENTRATING ON THINGS, SUCH AS READING THE NEWSPAPER OR WATCHING TELEVISION: NOT AT ALL
2. FEELING DOWN, DEPRESSED OR HOPELESS: NOT AT ALL
4. FEELING TIRED OR HAVING LITTLE ENERGY: SEVERAL DAYS
4. FEELING TIRED OR HAVING LITTLE ENERGY: SEVERAL DAYS
6. FEELING BAD ABOUT YOURSELF - OR THAT YOU ARE A FAILURE OR HAVE LET YOURSELF OR YOUR FAMILY DOWN: NOT AT ALL
SUM OF ALL RESPONSES TO PHQ QUESTIONS 1-9: 3
SUM OF ALL RESPONSES TO PHQ QUESTIONS 1-9: 3
5. POOR APPETITE OR OVEREATING: SEVERAL DAYS
10. IF YOU CHECKED OFF ANY PROBLEMS, HOW DIFFICULT HAVE THESE PROBLEMS MADE IT FOR YOU TO DO YOUR WORK, TAKE CARE OF THINGS AT HOME, OR GET ALONG WITH OTHER PEOPLE: NOT DIFFICULT AT ALL
8. MOVING OR SPEAKING SO SLOWLY THAT OTHER PEOPLE COULD HAVE NOTICED. OR THE OPPOSITE, BEING SO FIGETY OR RESTLESS THAT YOU HAVE BEEN MOVING AROUND A LOT MORE THAN USUAL: NOT AT ALL
3. TROUBLE FALLING OR STAYING ASLEEP: NOT AT ALL
8. MOVING OR SPEAKING SO SLOWLY THAT OTHER PEOPLE COULD HAVE NOTICED. OR THE OPPOSITE - BEING SO FIDGETY OR RESTLESS THAT YOU HAVE BEEN MOVING AROUND A LOT MORE THAN USUAL: NOT AT ALL
9. THOUGHTS THAT YOU WOULD BE BETTER OFF DEAD, OR OF HURTING YOURSELF: NOT AT ALL
5. POOR APPETITE OR OVEREATING: SEVERAL DAYS
SUM OF ALL RESPONSES TO PHQ QUESTIONS 1-9: 3
1. LITTLE INTEREST OR PLEASURE IN DOING THINGS: SEVERAL DAYS
6. FEELING BAD ABOUT YOURSELF - OR THAT YOU ARE A FAILURE OR HAVE LET YOURSELF OR YOUR FAMILY DOWN: NOT AT ALL
2. FEELING DOWN, DEPRESSED OR HOPELESS: NOT AT ALL
1. LITTLE INTEREST OR PLEASURE IN DOING THINGS: SEVERAL DAYS
7. TROUBLE CONCENTRATING ON THINGS, SUCH AS READING THE NEWSPAPER OR WATCHING TELEVISION: NOT AT ALL
9. THOUGHTS THAT YOU WOULD BE BETTER OFF DEAD, OR OF HURTING YOURSELF: NOT AT ALL
SUM OF ALL RESPONSES TO PHQ QUESTIONS 1-9: 3
10. IF YOU CHECKED OFF ANY PROBLEMS, HOW DIFFICULT HAVE THESE PROBLEMS MADE IT FOR YOU TO DO YOUR WORK, TAKE CARE OF THINGS AT HOME, OR GET ALONG WITH OTHER PEOPLE: NOT DIFFICULT AT ALL
SUM OF ALL RESPONSES TO PHQ QUESTIONS 1-9: 3
3. TROUBLE FALLING OR STAYING ASLEEP: NOT AT ALL

## 2025-04-09 NOTE — PROGRESS NOTES
Rachel Rodriguez (:  1990) is a 34 y.o. female,Established patient, here for evaluation of the following chief complaint(s):  Annual Exam    Assessment & Plan  Screening for thyroid disorder       Orders:    TSH; Future    Pelvic pain       Orders:    Chlamydia Trachomatis & Neisseria gonorrhoeae (GC) by amplified detection; Future    Vaginitis DNA Probe; Future    US NON OB TRANSVAGINAL; Future    POCT HCG, Prenancy, Ur  poct pregnancy negative.   Medication management       Orders:    CBC; Future    Comprehensive Metabolic Panel; Future      No follow-ups on file.       Subjective   HPI  Some pelvic pain one time when she leaned over.  The pain was sharp.  Sister just told her that she had a cyst in her pelvic area that she had to have removed.   Having some pain with intercourse sometimes.  Sporadic change in thickness of white discharge.      Review of Systems   Constitutional:  Negative for chills and fever.   Respiratory:  Negative for cough and shortness of breath.    Cardiovascular:  Negative for chest pain, palpitations and leg swelling.     Objective   .  Vitals:    25 1524   BP: 111/80   Pulse: 82   Temp: 97.3 °F (36.3 °C)   SpO2: 100%     Wt Readings from Last 3 Encounters:   25 78 kg (172 lb)   24 82.6 kg (182 lb 1.6 oz)   24 80.3 kg (177 lb)       Physical Exam  Constitutional:       Appearance: She is well-developed.   HENT:      Right Ear: External ear normal.      Left Ear: External ear normal.      Nose: Nose normal.   Cardiovascular:      Rate and Rhythm: Normal rate and regular rhythm.      Heart sounds: Normal heart sounds, S1 normal and S2 normal.   Pulmonary:      Effort: Pulmonary effort is normal. No respiratory distress.      Breath sounds: Normal breath sounds.   Musculoskeletal:         General: No deformity. Normal range of motion.      Cervical back: Full passive range of motion without pain and normal range of motion.   Skin:     General: Skin is warm

## 2025-04-10 ENCOUNTER — RESULTS FOLLOW-UP (OUTPATIENT)
Dept: FAMILY MEDICINE CLINIC | Age: 35
End: 2025-04-10

## 2025-04-10 ENCOUNTER — HOSPITAL ENCOUNTER (OUTPATIENT)
Age: 35
Discharge: HOME | End: 2025-04-10
Payer: COMMERCIAL

## 2025-04-10 ENCOUNTER — HOSPITAL ENCOUNTER (OUTPATIENT)
Dept: ULTRASOUND IMAGING | Age: 35
Discharge: HOME | End: 2025-04-12
Payer: COMMERCIAL

## 2025-04-10 DIAGNOSIS — R10.2 PELVIC PAIN: ICD-10-CM

## 2025-04-10 DIAGNOSIS — Z79.899 MEDICATION MANAGEMENT: ICD-10-CM

## 2025-04-10 DIAGNOSIS — Z13.29 SCREENING FOR THYROID DISORDER: ICD-10-CM

## 2025-04-10 LAB
ALBUMIN SERPL-MCNC: 4.2 G/DL (ref 3.5–5.2)
ALBUMIN/GLOB SERPL: 1.6 {RATIO} (ref 1–2.5)
ALP SERPL-CCNC: 53 U/L (ref 35–104)
ALT SERPL-CCNC: 10 U/L (ref 10–35)
ANION GAP SERPL CALCULATED.3IONS-SCNC: 10 MMOL/L (ref 9–16)
AST SERPL-CCNC: 15 U/L (ref 10–35)
BILIRUB SERPL-MCNC: 0.3 MG/DL (ref 0–1.2)
BUN SERPL-MCNC: 9 MG/DL (ref 6–20)
CALCIUM SERPL-MCNC: 8.8 MG/DL (ref 8.6–10.4)
CANDIDA SPECIES: NEGATIVE
CHLAMYDIA DNA UR QL NAA+PROBE: NEGATIVE
CHLORIDE SERPL-SCNC: 107 MMOL/L (ref 98–107)
CO2 SERPL-SCNC: 23 MMOL/L (ref 20–31)
CREAT SERPL-MCNC: 0.9 MG/DL (ref 0.6–0.9)
ERYTHROCYTE [DISTWIDTH] IN BLOOD BY AUTOMATED COUNT: 13.6 % (ref 11.8–14.4)
GARDNERELLA VAGINALIS: POSITIVE
GFR, ESTIMATED: 86 ML/MIN/1.73M2
GLUCOSE SERPL-MCNC: 98 MG/DL (ref 74–99)
HCT VFR BLD AUTO: 39.7 % (ref 36.3–47.1)
HGB BLD-MCNC: 13.4 G/DL (ref 11.9–15.1)
MCH RBC QN AUTO: 32.2 PG (ref 25.2–33.5)
MCHC RBC AUTO-ENTMCNC: 33.8 G/DL (ref 28.4–34.8)
MCV RBC AUTO: 95.4 FL (ref 82.6–102.9)
N GONORRHOEA DNA UR QL NAA+PROBE: NEGATIVE
NRBC BLD-RTO: 0 PER 100 WBC
PLATELET # BLD AUTO: 265 K/UL (ref 138–453)
PMV BLD AUTO: 8.8 FL (ref 8.1–13.5)
POTASSIUM SERPL-SCNC: 4.1 MMOL/L (ref 3.7–5.3)
PROT SERPL-MCNC: 6.9 G/DL (ref 6.6–8.7)
RBC # BLD AUTO: 4.16 M/UL (ref 3.95–5.11)
SODIUM SERPL-SCNC: 140 MMOL/L (ref 136–145)
SOURCE: ABNORMAL
SPECIMEN DESCRIPTION: NORMAL
TRICHOMONAS: NEGATIVE
TSH SERPL DL<=0.05 MIU/L-ACNC: 1.33 UIU/ML (ref 0.27–4.2)
WBC OTHER # BLD: 7.6 K/UL (ref 3.5–11.3)

## 2025-04-10 PROCEDURE — 80053 COMPREHEN METABOLIC PANEL: CPT

## 2025-04-10 PROCEDURE — 76830 TRANSVAGINAL US NON-OB: CPT

## 2025-04-10 PROCEDURE — 84443 ASSAY THYROID STIM HORMONE: CPT

## 2025-04-10 PROCEDURE — 85027 COMPLETE CBC AUTOMATED: CPT

## 2025-04-10 PROCEDURE — 36415 COLL VENOUS BLD VENIPUNCTURE: CPT

## 2025-04-10 RX ORDER — METRONIDAZOLE 500 MG/1
500 TABLET ORAL 2 TIMES DAILY
Qty: 14 TABLET | Refills: 0 | Status: SHIPPED | OUTPATIENT
Start: 2025-04-10 | End: 2025-04-17

## 2025-04-11 ENCOUNTER — RESULTS FOLLOW-UP (OUTPATIENT)
Dept: FAMILY MEDICINE CLINIC | Age: 35
End: 2025-04-11